# Patient Record
Sex: FEMALE | Race: WHITE | Employment: OTHER
[De-identification: names, ages, dates, MRNs, and addresses within clinical notes are randomized per-mention and may not be internally consistent; named-entity substitution may affect disease eponyms.]

---

## 2024-09-26 ENCOUNTER — HOSPITAL ENCOUNTER (OUTPATIENT)
Facility: HOSPITAL | Age: 79
Discharge: HOME | End: 2024-09-29
Payer: MEDICARE

## 2024-09-26 ENCOUNTER — TRANSCRIBE ORDERS (OUTPATIENT)
Facility: HOSPITAL | Age: 79
End: 2024-09-26

## 2024-09-26 DIAGNOSIS — M76.01 GLUTEAL TENDINITIS OF RIGHT BUTTOCK: ICD-10-CM

## 2024-09-26 DIAGNOSIS — M76.01 GLUTEAL TENDINITIS OF RIGHT BUTTOCK: Primary | ICD-10-CM

## 2024-09-26 PROCEDURE — 73502 X-RAY EXAM HIP UNI 2-3 VIEWS: CPT

## 2024-10-11 ENCOUNTER — OFFICE VISIT (OUTPATIENT)
Age: 79
End: 2024-10-11
Payer: MEDICARE

## 2024-10-11 VITALS
HEIGHT: 65 IN | SYSTOLIC BLOOD PRESSURE: 122 MMHG | WEIGHT: 124.1 LBS | OXYGEN SATURATION: 96 % | HEART RATE: 72 BPM | TEMPERATURE: 97.5 F | BODY MASS INDEX: 20.68 KG/M2 | RESPIRATION RATE: 18 BRPM | DIASTOLIC BLOOD PRESSURE: 78 MMHG

## 2024-10-11 DIAGNOSIS — Z98.2 HYDROCEPHALUS MANAGED WITH VP SHUNT (HCC): ICD-10-CM

## 2024-10-11 DIAGNOSIS — I67.1 CEREBRAL ANEURYSM: ICD-10-CM

## 2024-10-11 DIAGNOSIS — Z86.79 HISTORY OF SUBARACHNOID HEMORRHAGE: ICD-10-CM

## 2024-10-11 DIAGNOSIS — F01.A0 MILD VASCULAR DEMENTIA WITHOUT BEHAVIORAL DISTURBANCE, PSYCHOTIC DISTURBANCE, MOOD DISTURBANCE, OR ANXIETY (HCC): Primary | ICD-10-CM

## 2024-10-11 DIAGNOSIS — G91.9 HYDROCEPHALUS MANAGED WITH VP SHUNT (HCC): ICD-10-CM

## 2024-10-11 PROBLEM — J30.9 ALLERGIC RHINITIS: Status: ACTIVE | Noted: 2017-05-16

## 2024-10-11 PROBLEM — M85.80 OSTEOPENIA: Status: ACTIVE | Noted: 2018-02-12

## 2024-10-11 PROBLEM — E78.00 HYPERCHOLESTEROLEMIA: Status: ACTIVE | Noted: 2017-05-16

## 2024-10-11 PROBLEM — F03.A0 MILD DEMENTIA (HCC): Status: ACTIVE | Noted: 2022-01-11

## 2024-10-11 PROBLEM — N18.31 STAGE 3A CHRONIC KIDNEY DISEASE (HCC): Status: ACTIVE | Noted: 2022-12-22

## 2024-10-11 PROBLEM — R92.8 ABNORMAL MAMMOGRAM: Status: ACTIVE | Noted: 2019-01-03

## 2024-10-11 PROBLEM — Z98.41 S/P CATARACT EXTRACTION AND INSERTION OF INTRAOCULAR LENS, RIGHT: Status: ACTIVE | Noted: 2018-01-19

## 2024-10-11 PROBLEM — H40.9 GLAUCOMA: Status: ACTIVE | Noted: 2020-01-08

## 2024-10-11 PROBLEM — C44.711 BASAL CELL CARCINOMA OF LOWER EXTREMITY: Status: ACTIVE | Noted: 2023-09-11

## 2024-10-11 PROBLEM — I10 BENIGN ESSENTIAL HYPERTENSION: Status: ACTIVE | Noted: 2019-01-23

## 2024-10-11 PROBLEM — R41.3 MEMORY IMPAIRMENT: Status: ACTIVE | Noted: 2024-08-26

## 2024-10-11 PROBLEM — I65.29 CAROTID ATHEROSCLEROSIS: Status: ACTIVE | Noted: 2023-03-07

## 2024-10-11 PROBLEM — G31.84 MINIMAL COGNITIVE IMPAIRMENT: Status: ACTIVE | Noted: 2021-01-07

## 2024-10-11 PROBLEM — Z96.1 S/P CATARACT EXTRACTION AND INSERTION OF INTRAOCULAR LENS, RIGHT: Status: ACTIVE | Noted: 2018-01-19

## 2024-10-11 PROBLEM — N17.9 ACUTE KIDNEY INJURY (HCC): Status: ACTIVE | Noted: 2024-08-26

## 2024-10-11 PROCEDURE — G8400 PT W/DXA NO RESULTS DOC: HCPCS | Performed by: PSYCHIATRY & NEUROLOGY

## 2024-10-11 PROCEDURE — G8427 DOCREV CUR MEDS BY ELIG CLIN: HCPCS | Performed by: PSYCHIATRY & NEUROLOGY

## 2024-10-11 PROCEDURE — 99205 OFFICE O/P NEW HI 60 MIN: CPT | Performed by: PSYCHIATRY & NEUROLOGY

## 2024-10-11 PROCEDURE — 1090F PRES/ABSN URINE INCON ASSESS: CPT | Performed by: PSYCHIATRY & NEUROLOGY

## 2024-10-11 PROCEDURE — 3074F SYST BP LT 130 MM HG: CPT | Performed by: PSYCHIATRY & NEUROLOGY

## 2024-10-11 PROCEDURE — G8484 FLU IMMUNIZE NO ADMIN: HCPCS | Performed by: PSYCHIATRY & NEUROLOGY

## 2024-10-11 PROCEDURE — 4004F PT TOBACCO SCREEN RCVD TLK: CPT | Performed by: PSYCHIATRY & NEUROLOGY

## 2024-10-11 PROCEDURE — 3078F DIAST BP <80 MM HG: CPT | Performed by: PSYCHIATRY & NEUROLOGY

## 2024-10-11 PROCEDURE — 1123F ACP DISCUSS/DSCN MKR DOCD: CPT | Performed by: PSYCHIATRY & NEUROLOGY

## 2024-10-11 PROCEDURE — G8420 CALC BMI NORM PARAMETERS: HCPCS | Performed by: PSYCHIATRY & NEUROLOGY

## 2024-10-11 RX ORDER — ALENDRONATE SODIUM 70 MG/1
TABLET ORAL
COMMUNITY
Start: 2023-11-03

## 2024-10-11 RX ORDER — AMLODIPINE BESYLATE 10 MG/1
1 TABLET ORAL DAILY
COMMUNITY

## 2024-10-11 RX ORDER — CANDESARTAN CILEXETIL AND HYDROCHLOROTHIAZIDE 16; 12.5 MG/1; MG/1
TABLET ORAL
COMMUNITY

## 2024-10-11 RX ORDER — DONEPEZIL HYDROCHLORIDE 10 MG/1
TABLET, FILM COATED ORAL
COMMUNITY
Start: 2024-03-12

## 2024-10-11 RX ORDER — ROSUVASTATIN CALCIUM 10 MG/1
1 TABLET, COATED ORAL DAILY
COMMUNITY

## 2024-10-11 RX ORDER — METFORMIN HCL 500 MG
1 TABLET, EXTENDED RELEASE 24 HR ORAL 2 TIMES DAILY WITH MEALS
COMMUNITY
Start: 2024-01-10

## 2024-10-11 ASSESSMENT — PATIENT HEALTH QUESTIONNAIRE - PHQ9
SUM OF ALL RESPONSES TO PHQ QUESTIONS 1-9: 0
1. LITTLE INTEREST OR PLEASURE IN DOING THINGS: NOT AT ALL
SUM OF ALL RESPONSES TO PHQ9 QUESTIONS 1 & 2: 0
SUM OF ALL RESPONSES TO PHQ QUESTIONS 1-9: 0
2. FEELING DOWN, DEPRESSED OR HOPELESS: NOT AT ALL

## 2024-10-11 NOTE — PATIENT INSTRUCTIONS
Patient Education        Helping A Person With Dementia: Care Instructions  Your Care Instructions     Dementia is a loss of mental skills that affects daily life. It is different from mild memory loss that occurs with aging. Dementia can cause problems with memory, thinking clearly, and planning. It is different for everyone. But it usually gets worse slowly. Some people who have dementia can function well for a long time. But at some point it may become hard for the person to care for himself or herself.  It can be upsetting to learn that a loved one has this condition. You may be afraid and worried about what will happen. You may wonder how you will care for the person. There is no cure for dementia. But medicine may be able to slow memory loss and improve thinking for a while. Other medicines may help with sleep, depression, and behavior changes.  Dementia is different for everyone. In some cases, people can function well for a long time. You can help your loved one by making his or her home life easier and safer. You also need to take care of yourself. Caregiving can be stressful. But support is available to help you and give you a break when you need it.  The [employer] offers good information and support. If you are caring for someone with dementia, you can help make life safer and more comfortable. You can also help your loved one make decisions about future care. You may also want to bring up legal and financial issues. These are hard but important conversations to have.  Follow-up care is a key part of your loved one's treatment and safety. Be sure to make and go to all appointments, and call your doctor if your loved one is having problems. It's also a good idea to know your loved one's test results and keep a list of the medicines he or she takes.  How can you care for your loved one at home?  Taking care of the person  If the person takes medicine for dementia, help him or her take it  ambulatory

## 2025-02-11 ENCOUNTER — TELEPHONE (OUTPATIENT)
Age: 80
End: 2025-02-11

## 2025-02-11 NOTE — TELEPHONE ENCOUNTER
Patient requesting a summary of her first appt with Dr. Mcmanus be faxed to her PCP Dr. José Manuel Coulter    Phone number - [phone]    Fax number - [phone]

## 2025-02-12 NOTE — TELEPHONE ENCOUNTER
Office note faxed to patient's PCP, Dr. José Manuel Coulter at [phone] and confirmation received.

## 2025-02-14 ENCOUNTER — APPOINTMENT (OUTPATIENT)
Facility: HOSPITAL | Age: 80
End: 2025-02-14
Payer: MEDICARE

## 2025-02-14 ENCOUNTER — HOSPITAL ENCOUNTER (EMERGENCY)
Facility: HOSPITAL | Age: 80
Discharge: HOME | End: 2025-02-14
Attending: EMERGENCY MEDICINE
Payer: MEDICARE

## 2025-02-14 VITALS
HEART RATE: 109 BPM | TEMPERATURE: 98.3 F | HEIGHT: 65 IN | RESPIRATION RATE: 22 BRPM | DIASTOLIC BLOOD PRESSURE: 76 MMHG | SYSTOLIC BLOOD PRESSURE: 107 MMHG | BODY MASS INDEX: 20.83 KG/M2 | WEIGHT: 125 LBS | OXYGEN SATURATION: 99 %

## 2025-02-14 DIAGNOSIS — I48.91 ATRIAL FIBRILLATION, UNSPECIFIED TYPE (HCC): ICD-10-CM

## 2025-02-14 DIAGNOSIS — E87.6 HYPOKALEMIA: ICD-10-CM

## 2025-02-14 DIAGNOSIS — E86.0 DEHYDRATION: ICD-10-CM

## 2025-02-14 DIAGNOSIS — R55 SYNCOPE AND COLLAPSE: Primary | ICD-10-CM

## 2025-02-14 LAB
ALBUMIN SERPL-MCNC: 3.6 G/DL (ref 3.5–5)
ALBUMIN/GLOB SERPL: 0.8 (ref 1.1–2.2)
ALP SERPL-CCNC: 47 U/L (ref 45–117)
ALT SERPL-CCNC: 20 U/L (ref 12–78)
ANION GAP SERPL CALC-SCNC: 6 MMOL/L (ref 2–12)
AST SERPL-CCNC: 19 U/L (ref 15–37)
BASOPHILS # BLD: 0.1 K/UL (ref 0–0.1)
BASOPHILS NFR BLD: 0.9 % (ref 0–1)
BILIRUB SERPL-MCNC: 0.2 MG/DL (ref 0.2–1)
BUN SERPL-MCNC: 26 MG/DL (ref 6–20)
BUN/CREAT SERPL: 15 (ref 12–20)
CALCIUM SERPL-MCNC: 9.3 MG/DL (ref 8.5–10.1)
CHLORIDE SERPL-SCNC: 107 MMOL/L (ref 97–108)
CO2 SERPL-SCNC: 26 MMOL/L (ref 21–32)
COMMENT:: NORMAL
CREAT SERPL-MCNC: 1.76 MG/DL (ref 0.55–1.02)
DIFFERENTIAL METHOD BLD: ABNORMAL
EOSINOPHIL # BLD: 0.05 K/UL (ref 0–0.4)
EOSINOPHIL NFR BLD: 0.5 % (ref 0–7)
ERYTHROCYTE [DISTWIDTH] IN BLOOD BY AUTOMATED COUNT: 13.7 % (ref 11.5–14.5)
GLOBULIN SER CALC-MCNC: 4.4 G/DL (ref 2–4)
GLUCOSE SERPL-MCNC: 143 MG/DL (ref 65–100)
HCT VFR BLD AUTO: 37.3 % (ref 35–47)
HGB BLD-MCNC: 12.3 G/DL (ref 11.5–16)
IMM GRANULOCYTES # BLD AUTO: 0.03 K/UL (ref 0–0.04)
IMM GRANULOCYTES NFR BLD AUTO: 0.3 % (ref 0–0.5)
LYMPHOCYTES # BLD: 2.55 K/UL (ref 0.8–3.5)
LYMPHOCYTES NFR BLD: 23 % (ref 12–49)
MAGNESIUM SERPL-MCNC: 2.6 MG/DL (ref 1.6–2.4)
MCH RBC QN AUTO: 29.1 PG (ref 26–34)
MCHC RBC AUTO-ENTMCNC: 33 G/DL (ref 30–36.5)
MCV RBC AUTO: 88.4 FL (ref 80–99)
MONOCYTES # BLD: 0.64 K/UL (ref 0–1)
MONOCYTES NFR BLD: 5.8 % (ref 5–13)
NEUTS SEG # BLD: 7.73 K/UL (ref 1.8–8)
NEUTS SEG NFR BLD: 69.5 % (ref 32–75)
NRBC # BLD: 0 K/UL (ref 0–0.01)
NRBC BLD-RTO: 0 PER 100 WBC
PLATELET # BLD AUTO: 378 K/UL (ref 150–400)
PMV BLD AUTO: 10.7 FL (ref 8.9–12.9)
POTASSIUM SERPL-SCNC: 3.2 MMOL/L (ref 3.5–5.1)
PROT SERPL-MCNC: 8 G/DL (ref 6.4–8.2)
RBC # BLD AUTO: 4.22 M/UL (ref 3.8–5.2)
SODIUM SERPL-SCNC: 139 MMOL/L (ref 136–145)
SPECIMEN HOLD: NORMAL
TROPONIN I SERPL HS-MCNC: 9 NG/L (ref 0–51)
TSH SERPL DL<=0.05 MIU/L-ACNC: 0.61 UIU/ML (ref 0.36–3.74)
WBC # BLD AUTO: 11.1 K/UL (ref 3.6–11)

## 2025-02-14 PROCEDURE — 80053 COMPREHEN METABOLIC PANEL: CPT

## 2025-02-14 PROCEDURE — 84484 ASSAY OF TROPONIN QUANT: CPT

## 2025-02-14 PROCEDURE — 36415 COLL VENOUS BLD VENIPUNCTURE: CPT

## 2025-02-14 PROCEDURE — 83735 ASSAY OF MAGNESIUM: CPT

## 2025-02-14 PROCEDURE — 96360 HYDRATION IV INFUSION INIT: CPT

## 2025-02-14 PROCEDURE — 71045 X-RAY EXAM CHEST 1 VIEW: CPT

## 2025-02-14 PROCEDURE — 99285 EMERGENCY DEPT VISIT HI MDM: CPT

## 2025-02-14 PROCEDURE — 85025 COMPLETE CBC W/AUTO DIFF WBC: CPT

## 2025-02-14 PROCEDURE — 70450 CT HEAD/BRAIN W/O DYE: CPT

## 2025-02-14 PROCEDURE — 2580000003 HC RX 258: Performed by: EMERGENCY MEDICINE

## 2025-02-14 PROCEDURE — 72125 CT NECK SPINE W/O DYE: CPT

## 2025-02-14 PROCEDURE — 6370000000 HC RX 637 (ALT 250 FOR IP): Performed by: EMERGENCY MEDICINE

## 2025-02-14 PROCEDURE — 93005 ELECTROCARDIOGRAM TRACING: CPT | Performed by: EMERGENCY MEDICINE

## 2025-02-14 PROCEDURE — 84443 ASSAY THYROID STIM HORMONE: CPT

## 2025-02-14 RX ORDER — 0.9 % SODIUM CHLORIDE 0.9 %
500 INTRAVENOUS SOLUTION INTRAVENOUS ONCE
Status: COMPLETED | OUTPATIENT
Start: 2025-02-14 | End: 2025-02-14

## 2025-02-14 RX ADMIN — POTASSIUM BICARBONATE 20 MEQ: 782 TABLET, EFFERVESCENT ORAL at 16:54

## 2025-02-14 RX ADMIN — SODIUM CHLORIDE 500 ML: 9 INJECTION, SOLUTION INTRAVENOUS at 15:47

## 2025-02-14 ASSESSMENT — PAIN - FUNCTIONAL ASSESSMENT: PAIN_FUNCTIONAL_ASSESSMENT: NONE - DENIES PAIN

## 2025-02-14 NOTE — ED PROVIDER NOTES
[facility] EMERGENCY DEPARTMENT  EMERGENCY DEPARTMENT ENCOUNTER      Pt Name: Rusty Nevarez  MRN: [id number]  Birthdate 1945  Date of evaluation: 2/14/2025  Provider: Precious Pederson DO    CHIEF COMPLAINT       Chief Complaint   Patient presents with    Loss of Consciousness         HISTORY OF PRESENT ILLNESS   (Location/Symptom, Timing/Onset, Context/Setting, Quality, Duration, Modifying Factors, Severity)  Note limiting factors.   HPI      Review of External Medical Records:     Nursing Notes were reviewed.    REVIEW OF SYSTEMS    (2-9 systems for level 4, 10 or more for level 5)     Review of Systems    Except as noted above the remainder of the review of systems was reviewed and negative.       PAST MEDICAL HISTORY     Past Medical History:   Diagnosis Date    Atrial fibrillation (HCC)     Subarachnoid bleed (HCC)          SURGICAL HISTORY     History reviewed. No pertinent surgical history.      CURRENT MEDICATIONS       Previous Medications    ALENDRONATE (FOSAMAX) 70 MG TABLET    Take 1 tablet once weekly on an empty stomach with 10 to 12 ounces of water. Sit upright for 30 minutes    AMLODIPINE (NORVASC) 10 MG TABLET    Take 1 tablet by mouth daily    CANDESARTAN-HYDROCHLOROTHIAZIDE (ATACAND HCT) 16-12.5 MG PER TABLET    TAKE 1 TABLET BY MOUTH EVERY MORNING FOR HIGH BLOOD PRESSURE    CYANOCOBALAMIN 100 MCG TABLET    Take 1 tablet by mouth daily    DONEPEZIL (ARICEPT) 10 MG TABLET    TAKE 1 TABLET (10 MG) BY ORAL ROUTE ONCE DAILY IN THE EVENING    METFORMIN (GLUCOPHAGE-XR) 500 MG EXTENDED RELEASE TABLET    Take 1 tablet by mouth 2 times daily (with meals)    ROSUVASTATIN (CRESTOR) 10 MG TABLET    Take 1 tablet by mouth daily       ALLERGIES     Cefazolin and Erythromycin    FAMILY HISTORY     History reviewed. No pertinent family history.       SOCIAL HISTORY       Social History     Socioeconomic History    Marital status:      Spouse name: None    Number of children:  she was at the grocery store and was walking when suddenly everything \"went black\".  She states that she fell down to her buttocks and then eventually laid down because she was tired of sitting.  EMS reports that she had struck her head.  After initial interview family arrived and states that she did hit her head as well.  She is not on any blood thinners.  I was able to palpate a shunt but patient denied a history of brain injury or surgery.  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  Patient states that she is feeling fine now.  Consider A-fib with RVR versus cardiogenic syncope versus dehydration versus electrolyte abnormality versus other.  Will order head CT cervical spine secondary to fall with confirm report of striking head as well as lab work    Amount and/or Complexity of Data Reviewed  Radiology: ordered.  ECG/medicine tests: ordered.    Risk  Prescription drug management.            REASSESSMENT     ED Course as of 02/14/25 1657   Fri Feb 14, 2025   1459 ECG performed at 1446 shows atrial fibrillation with rapid ventricular response with ventricular rate 123 no STEMI normal axis. [WG]      ED Course User Index  [WG] Mick Brooks DO           CONSULTS:  None    PROCEDURES:  Unless otherwise noted below, none     Procedures    4:57 PM  Noted some renal insufficiency but reviewed records and she does have a history of chronic kidney disease.    Remainder of labs are noncontributory.  Did notice mildly low potassium.  I first discussed admitting her due to syncope of unknown source.  However we discussed that most likely she would rest better at home, would not be exposed to other infectious processes.  They are requesting discharge but advised them to return to emergency department symptoms worsen    FINAL IMPRESSION      1. Syncope and collapse    2. Dehydration    3. Atrial fibrillation, unspecified type (HCC)    4. Hypokalemia          DISPOSITION/PLAN

## 2025-02-14 NOTE — ED TRIAGE NOTES
Pt arrived via EMS following syncopal episode while at grocery store. Per EMS ot hit head, no obvious sign of injury. Per spouse, prior to syncopizing, pt reported feeling dizzy. Pt intermittently confused on details, this is baseline cognition per spouse. Afib 130-150 for EMS. Pt Afib 101 on arrival and pt states she \"feels fine\". Hx of brain bleed, not on blood thinners.

## 2025-02-15 LAB
EKG DIAGNOSIS: NORMAL
EKG Q-T INTERVAL: 330 MS
EKG QRS DURATION: 82 MS
EKG QTC CALCULATION (BAZETT): 472 MS
EKG R AXIS: 59 DEGREES
EKG T AXIS: 14 DEGREES
EKG VENTRICULAR RATE: 123 BPM

## 2025-02-15 PROCEDURE — 93010 ELECTROCARDIOGRAM REPORT: CPT | Performed by: INTERNAL MEDICINE

## 2025-03-15 ENCOUNTER — HOSPITAL ENCOUNTER (INPATIENT)
Facility: HOSPITAL | Age: 80
LOS: 13 days | Discharge: HOME HEALTH | DRG: 871 | End: 2025-03-28
Attending: EMERGENCY MEDICINE | Admitting: HOSPITALIST
Payer: MEDICARE

## 2025-03-15 ENCOUNTER — APPOINTMENT (OUTPATIENT)
Facility: HOSPITAL | Age: 80
DRG: 871 | End: 2025-03-15
Payer: MEDICARE

## 2025-03-15 DIAGNOSIS — A41.9 SEPSIS, DUE TO UNSPECIFIED ORGANISM, UNSPECIFIED WHETHER ACUTE ORGAN DYSFUNCTION PRESENT (HCC): Primary | ICD-10-CM

## 2025-03-15 DIAGNOSIS — R06.02 SHORTNESS OF BREATH: ICD-10-CM

## 2025-03-15 DIAGNOSIS — E86.0 DEHYDRATION: ICD-10-CM

## 2025-03-15 DIAGNOSIS — R68.89 FLU-LIKE SYMPTOMS: ICD-10-CM

## 2025-03-15 DIAGNOSIS — I48.11 LONGSTANDING PERSISTENT ATRIAL FIBRILLATION (HCC): ICD-10-CM

## 2025-03-15 DIAGNOSIS — R41.0 CONFUSION: ICD-10-CM

## 2025-03-15 LAB
ALBUMIN SERPL-MCNC: 3.1 G/DL (ref 3.5–5.2)
ALBUMIN/GLOB SERPL: 0.9 (ref 1.1–2.2)
ALP SERPL-CCNC: 123 U/L (ref 35–104)
ALT SERPL-CCNC: 27 U/L (ref 10–35)
AMORPH CRY URNS QL MICRO: ABNORMAL
ANION GAP SERPL CALC-SCNC: 15 MMOL/L (ref 2–12)
APPEARANCE UR: ABNORMAL
AST SERPL-CCNC: 26 U/L (ref 10–35)
BACTERIA URNS QL MICRO: ABNORMAL /HPF
BASOPHILS # BLD: 0.02 K/UL (ref 0–0.1)
BASOPHILS NFR BLD: 0.1 % (ref 0–1)
BILIRUB SERPL-MCNC: 0.9 MG/DL (ref 0.2–1)
BILIRUB UR QL CFM: NEGATIVE
BUN SERPL-MCNC: 20 MG/DL (ref 8–23)
BUN/CREAT SERPL: 17 (ref 12–20)
CALCIUM SERPL-MCNC: 8.2 MG/DL (ref 8.8–10.2)
CHLORIDE SERPL-SCNC: 99 MMOL/L (ref 98–107)
CO2 SERPL-SCNC: 23 MMOL/L (ref 22–29)
COLOR UR: ABNORMAL
COMMENT:: NORMAL
CREAT SERPL-MCNC: 1.19 MG/DL (ref 0.5–0.9)
DIFFERENTIAL METHOD BLD: ABNORMAL
EOSINOPHIL # BLD: 0 K/UL (ref 0–0.4)
EOSINOPHIL NFR BLD: 0 % (ref 0–7)
EPITH CASTS URNS QL MICRO: ABNORMAL /LPF
ERYTHROCYTE [DISTWIDTH] IN BLOOD BY AUTOMATED COUNT: 14.4 % (ref 11.5–14.5)
FLUAV RNA SPEC QL NAA+PROBE: NOT DETECTED
FLUBV RNA SPEC QL NAA+PROBE: NOT DETECTED
GLOBULIN SER CALC-MCNC: 3.5 G/DL (ref 2–4)
GLUCOSE SERPL-MCNC: 179 MG/DL (ref 65–100)
GLUCOSE UR STRIP.AUTO-MCNC: NEGATIVE MG/DL
GRAN CASTS URNS QL MICRO: ABNORMAL /LPF
HCT VFR BLD AUTO: 29.3 % (ref 35–47)
HGB BLD-MCNC: 10.2 G/DL (ref 11.5–16)
HGB UR QL STRIP: ABNORMAL
IMM GRANULOCYTES # BLD AUTO: 0.08 K/UL (ref 0–0.04)
IMM GRANULOCYTES NFR BLD AUTO: 0.5 % (ref 0–0.5)
KETONES UR QL STRIP.AUTO: ABNORMAL MG/DL
LACTATE SERPL-SCNC: 1.1 MMOL/L (ref 0.4–2)
LEUKOCYTE ESTERASE UR QL STRIP.AUTO: ABNORMAL
LIPASE SERPL-CCNC: 25 U/L (ref 13–60)
LYMPHOCYTES # BLD: 0.52 K/UL (ref 0.8–3.5)
LYMPHOCYTES NFR BLD: 3.2 % (ref 12–49)
MCH RBC QN AUTO: 29.6 PG (ref 26–34)
MCHC RBC AUTO-ENTMCNC: 34.8 G/DL (ref 30–36.5)
MCV RBC AUTO: 84.9 FL (ref 80–99)
MONOCYTES # BLD: 1.1 K/UL (ref 0–1)
MONOCYTES NFR BLD: 6.7 % (ref 5–13)
NEUTS SEG # BLD: 14.68 K/UL (ref 1.8–8)
NEUTS SEG NFR BLD: 89.5 % (ref 32–75)
NITRITE UR QL STRIP.AUTO: NEGATIVE
NRBC # BLD: 0 K/UL (ref 0–0.01)
NRBC BLD-RTO: 0 PER 100 WBC
PH UR STRIP: 5.5 (ref 5–8)
PLATELET # BLD AUTO: 188 K/UL (ref 150–400)
PMV BLD AUTO: 11.6 FL (ref 8.9–12.9)
POTASSIUM SERPL-SCNC: 3.2 MMOL/L (ref 3.5–5.1)
PROT SERPL-MCNC: 6.6 G/DL (ref 6.4–8.3)
PROT UR STRIP-MCNC: >300 MG/DL
RBC # BLD AUTO: 3.45 M/UL (ref 3.8–5.2)
RBC #/AREA URNS HPF: ABNORMAL /HPF
RBC MORPH BLD: ABNORMAL
SARS-COV-2 RNA RESP QL NAA+PROBE: NOT DETECTED
SODIUM SERPL-SCNC: 137 MMOL/L (ref 136–145)
SOURCE: NORMAL
SP GR UR REFRACTOMETRY: 1.02 (ref 1–1.03)
SPECIMEN HOLD: NORMAL
SPECIMEN HOLD: NORMAL
UROBILINOGEN UR QL STRIP.AUTO: 1 EU/DL (ref 0.2–1)
WBC # BLD AUTO: 16.4 K/UL (ref 3.6–11)
WBC MORPH BLD: ABNORMAL
WBC URNS QL MICRO: ABNORMAL /HPF (ref 0–4)

## 2025-03-15 PROCEDURE — 81001 URINALYSIS AUTO W/SCOPE: CPT

## 2025-03-15 PROCEDURE — 2580000003 HC RX 258: Performed by: HOSPITALIST

## 2025-03-15 PROCEDURE — 1100000000 HC RM PRIVATE

## 2025-03-15 PROCEDURE — 87154 CUL TYP ID BLD PTHGN 6+ TRGT: CPT

## 2025-03-15 PROCEDURE — 6360000002 HC RX W HCPCS: Performed by: HOSPITALIST

## 2025-03-15 PROCEDURE — 85025 COMPLETE CBC W/AUTO DIFF WBC: CPT

## 2025-03-15 PROCEDURE — 71046 X-RAY EXAM CHEST 2 VIEWS: CPT

## 2025-03-15 PROCEDURE — 96361 HYDRATE IV INFUSION ADD-ON: CPT

## 2025-03-15 PROCEDURE — 99285 EMERGENCY DEPT VISIT HI MDM: CPT

## 2025-03-15 PROCEDURE — 2500000003 HC RX 250 WO HCPCS: Performed by: HOSPITALIST

## 2025-03-15 PROCEDURE — 87636 SARSCOV2 & INF A&B AMP PRB: CPT

## 2025-03-15 PROCEDURE — 87040 BLOOD CULTURE FOR BACTERIA: CPT

## 2025-03-15 PROCEDURE — 6370000000 HC RX 637 (ALT 250 FOR IP): Performed by: EMERGENCY MEDICINE

## 2025-03-15 PROCEDURE — 6370000000 HC RX 637 (ALT 250 FOR IP): Performed by: HOSPITALIST

## 2025-03-15 PROCEDURE — 96365 THER/PROPH/DIAG IV INF INIT: CPT

## 2025-03-15 PROCEDURE — 83605 ASSAY OF LACTIC ACID: CPT

## 2025-03-15 PROCEDURE — 93005 ELECTROCARDIOGRAM TRACING: CPT | Performed by: EMERGENCY MEDICINE

## 2025-03-15 PROCEDURE — 80053 COMPREHEN METABOLIC PANEL: CPT

## 2025-03-15 PROCEDURE — 87077 CULTURE AEROBIC IDENTIFY: CPT

## 2025-03-15 PROCEDURE — 36415 COLL VENOUS BLD VENIPUNCTURE: CPT

## 2025-03-15 PROCEDURE — 2500000003 HC RX 250 WO HCPCS: Performed by: EMERGENCY MEDICINE

## 2025-03-15 PROCEDURE — 6360000002 HC RX W HCPCS: Performed by: EMERGENCY MEDICINE

## 2025-03-15 PROCEDURE — 83690 ASSAY OF LIPASE: CPT

## 2025-03-15 PROCEDURE — 87086 URINE CULTURE/COLONY COUNT: CPT

## 2025-03-15 PROCEDURE — 87186 SC STD MICRODIL/AGAR DIL: CPT

## 2025-03-15 PROCEDURE — 87088 URINE BACTERIA CULTURE: CPT

## 2025-03-15 PROCEDURE — 2580000003 HC RX 258: Performed by: EMERGENCY MEDICINE

## 2025-03-15 PROCEDURE — 0202U NFCT DS 22 TRGT SARS-COV-2: CPT

## 2025-03-15 RX ORDER — VALSARTAN 80 MG/1
160 TABLET ORAL DAILY
Status: DISCONTINUED | OUTPATIENT
Start: 2025-03-16 | End: 2025-03-16

## 2025-03-15 RX ORDER — SODIUM CHLORIDE 9 MG/ML
INJECTION, SOLUTION INTRAVENOUS PRN
Status: DISCONTINUED | OUTPATIENT
Start: 2025-03-15 | End: 2025-03-28 | Stop reason: HOSPADM

## 2025-03-15 RX ORDER — ONDANSETRON 2 MG/ML
4 INJECTION INTRAMUSCULAR; INTRAVENOUS EVERY 6 HOURS PRN
Status: DISCONTINUED | OUTPATIENT
Start: 2025-03-15 | End: 2025-03-28 | Stop reason: HOSPADM

## 2025-03-15 RX ORDER — ENOXAPARIN SODIUM 100 MG/ML
40 INJECTION SUBCUTANEOUS DAILY
Status: DISCONTINUED | OUTPATIENT
Start: 2025-03-16 | End: 2025-03-28 | Stop reason: HOSPADM

## 2025-03-15 RX ORDER — ROSUVASTATIN CALCIUM 10 MG/1
10 TABLET, COATED ORAL NIGHTLY
Status: DISCONTINUED | OUTPATIENT
Start: 2025-03-15 | End: 2025-03-28 | Stop reason: HOSPADM

## 2025-03-15 RX ORDER — MAGNESIUM SULFATE IN WATER 40 MG/ML
2000 INJECTION, SOLUTION INTRAVENOUS PRN
Status: DISCONTINUED | OUTPATIENT
Start: 2025-03-15 | End: 2025-03-28 | Stop reason: HOSPADM

## 2025-03-15 RX ORDER — SODIUM CHLORIDE 9 MG/ML
INJECTION, SOLUTION INTRAVENOUS CONTINUOUS
Status: DISCONTINUED | OUTPATIENT
Start: 2025-03-15 | End: 2025-03-16

## 2025-03-15 RX ORDER — POTASSIUM CHLORIDE 7.45 MG/ML
10 INJECTION INTRAVENOUS PRN
Status: DISCONTINUED | OUTPATIENT
Start: 2025-03-15 | End: 2025-03-28 | Stop reason: HOSPADM

## 2025-03-15 RX ORDER — ACETAMINOPHEN 325 MG/1
650 TABLET ORAL EVERY 6 HOURS PRN
Status: DISCONTINUED | OUTPATIENT
Start: 2025-03-15 | End: 2025-03-28 | Stop reason: HOSPADM

## 2025-03-15 RX ORDER — 0.9 % SODIUM CHLORIDE 0.9 %
1000 INTRAVENOUS SOLUTION INTRAVENOUS ONCE
Status: COMPLETED | OUTPATIENT
Start: 2025-03-15 | End: 2025-03-15

## 2025-03-15 RX ORDER — SODIUM CHLORIDE 0.9 % (FLUSH) 0.9 %
5-40 SYRINGE (ML) INJECTION PRN
Status: DISCONTINUED | OUTPATIENT
Start: 2025-03-15 | End: 2025-03-28 | Stop reason: HOSPADM

## 2025-03-15 RX ORDER — AMLODIPINE BESYLATE 5 MG/1
10 TABLET ORAL DAILY
Status: DISCONTINUED | OUTPATIENT
Start: 2025-03-16 | End: 2025-03-15

## 2025-03-15 RX ORDER — POLYETHYLENE GLYCOL 3350 17 G/17G
17 POWDER, FOR SOLUTION ORAL DAILY PRN
Status: DISCONTINUED | OUTPATIENT
Start: 2025-03-15 | End: 2025-03-28 | Stop reason: HOSPADM

## 2025-03-15 RX ORDER — DEXTROSE MONOHYDRATE 100 MG/ML
INJECTION, SOLUTION INTRAVENOUS CONTINUOUS PRN
Status: DISCONTINUED | OUTPATIENT
Start: 2025-03-15 | End: 2025-03-28 | Stop reason: HOSPADM

## 2025-03-15 RX ORDER — ACETAMINOPHEN 650 MG/1
650 SUPPOSITORY RECTAL EVERY 6 HOURS PRN
Status: DISCONTINUED | OUTPATIENT
Start: 2025-03-15 | End: 2025-03-28 | Stop reason: HOSPADM

## 2025-03-15 RX ORDER — SODIUM CHLORIDE 0.9 % (FLUSH) 0.9 %
5-40 SYRINGE (ML) INJECTION EVERY 12 HOURS SCHEDULED
Status: DISCONTINUED | OUTPATIENT
Start: 2025-03-15 | End: 2025-03-28 | Stop reason: HOSPADM

## 2025-03-15 RX ORDER — CANDESARTAN CILEXETIL AND HYDROCHLOROTHIAZIDE 16; 12.5 MG/1; MG/1
1 TABLET ORAL DAILY
Status: DISCONTINUED | OUTPATIENT
Start: 2025-03-16 | End: 2025-03-15

## 2025-03-15 RX ORDER — METOPROLOL SUCCINATE 50 MG/1
50 TABLET, EXTENDED RELEASE ORAL DAILY
Status: DISCONTINUED | OUTPATIENT
Start: 2025-03-16 | End: 2025-03-28 | Stop reason: HOSPADM

## 2025-03-15 RX ORDER — METOPROLOL SUCCINATE 50 MG/1
50 TABLET, EXTENDED RELEASE ORAL DAILY
COMMUNITY

## 2025-03-15 RX ORDER — METOPROLOL TARTRATE 1 MG/ML
5 INJECTION, SOLUTION INTRAVENOUS EVERY 6 HOURS PRN
Status: DISCONTINUED | OUTPATIENT
Start: 2025-03-15 | End: 2025-03-27

## 2025-03-15 RX ORDER — INSULIN LISPRO 100 [IU]/ML
0-8 INJECTION, SOLUTION INTRAVENOUS; SUBCUTANEOUS
Status: DISCONTINUED | OUTPATIENT
Start: 2025-03-15 | End: 2025-03-15

## 2025-03-15 RX ORDER — ONDANSETRON 4 MG/1
4 TABLET, ORALLY DISINTEGRATING ORAL EVERY 8 HOURS PRN
Status: DISCONTINUED | OUTPATIENT
Start: 2025-03-15 | End: 2025-03-28 | Stop reason: HOSPADM

## 2025-03-15 RX ORDER — DONEPEZIL HYDROCHLORIDE 5 MG/1
10 TABLET, FILM COATED ORAL NIGHTLY
Status: DISCONTINUED | OUTPATIENT
Start: 2025-03-15 | End: 2025-03-28 | Stop reason: HOSPADM

## 2025-03-15 RX ORDER — HYDROCHLOROTHIAZIDE 25 MG/1
12.5 TABLET ORAL DAILY
Status: DISCONTINUED | OUTPATIENT
Start: 2025-03-16 | End: 2025-03-15

## 2025-03-15 RX ORDER — POTASSIUM CHLORIDE 750 MG/1
40 TABLET, EXTENDED RELEASE ORAL PRN
Status: DISCONTINUED | OUTPATIENT
Start: 2025-03-15 | End: 2025-03-28 | Stop reason: HOSPADM

## 2025-03-15 RX ORDER — INSULIN LISPRO 100 [IU]/ML
0-8 INJECTION, SOLUTION INTRAVENOUS; SUBCUTANEOUS
Status: DISCONTINUED | OUTPATIENT
Start: 2025-03-16 | End: 2025-03-28 | Stop reason: HOSPADM

## 2025-03-15 RX ORDER — ACETAMINOPHEN 500 MG
1000 TABLET ORAL
Status: COMPLETED | OUTPATIENT
Start: 2025-03-15 | End: 2025-03-15

## 2025-03-15 RX ADMIN — SODIUM CHLORIDE, PRESERVATIVE FREE 5 ML: 5 INJECTION INTRAVENOUS at 22:53

## 2025-03-15 RX ADMIN — WATER 1000 MG: 1 INJECTION INTRAMUSCULAR; INTRAVENOUS; SUBCUTANEOUS at 19:39

## 2025-03-15 RX ADMIN — POTASSIUM BICARBONATE 40 MEQ: 782 TABLET, EFFERVESCENT ORAL at 22:53

## 2025-03-15 RX ADMIN — AZITHROMYCIN MONOHYDRATE 500 MG: 500 INJECTION, POWDER, LYOPHILIZED, FOR SOLUTION INTRAVENOUS at 22:53

## 2025-03-15 RX ADMIN — ACETAMINOPHEN 1000 MG: 500 TABLET ORAL at 19:38

## 2025-03-15 RX ADMIN — SODIUM CHLORIDE: 0.9 INJECTION, SOLUTION INTRAVENOUS at 22:03

## 2025-03-15 RX ADMIN — SODIUM CHLORIDE 1000 ML: 0.9 INJECTION, SOLUTION INTRAVENOUS at 19:39

## 2025-03-15 RX ADMIN — ROSUVASTATIN CALCIUM 10 MG: 10 TABLET, FILM COATED ORAL at 22:53

## 2025-03-15 RX ADMIN — DONEPEZIL HYDROCHLORIDE 10 MG: 5 TABLET ORAL at 22:53

## 2025-03-15 ASSESSMENT — PAIN SCALES - GENERAL: PAINLEVEL_OUTOF10: 0

## 2025-03-15 ASSESSMENT — PAIN - FUNCTIONAL ASSESSMENT: PAIN_FUNCTIONAL_ASSESSMENT: NONE - DENIES PAIN

## 2025-03-15 NOTE — ED PROVIDER NOTES
[facility] EMERGENCY DEPARTMENT  EMERGENCY DEPARTMENT ENCOUNTER      Pt Name: Rusty Nevarez  MRN: [id number]  Birthdate 1945  Date of evaluation: 3/15/2025  Provider: Shin Frausto MD    CHIEF COMPLAINT       Chief Complaint   Patient presents with    Flu-Like Symptoms         HISTORY OF PRESENT ILLNESS   (Location/Symptom, Timing/Onset, Context/Setting, Quality, Duration, Modifying Factors, Severity)  Note limiting factors.   79-year-old female presents from home accompanied by her  and daughter.  She been feeling sick for the past 3 days.  Been having chills with what has been described as rigors off and on for the past few days.  She is been getting sweaty and nauseated with fatigue.  Also increased confusion.   also reports increased urinary frequency.  She has had a slight cough.  No vomiting.  She has had diarrhea.  Patient denies any chest pain or shortness of breath.  Medical history significant for subarachnoid bleed and atrial fibrillation.    The history is provided by the patient, the spouse, a relative and medical records.         Review of External Medical Records:     Nursing Notes were reviewed.    REVIEW OF SYSTEMS    (2-9 systems for level 4, 10 or more for level 5)     Review of Systems   Constitutional:  Positive for chills and fatigue.   HENT:  Negative for sore throat.    Eyes:  Negative for visual disturbance.   Respiratory:  Negative for cough.    Cardiovascular:  Negative for palpitations.   Gastrointestinal:  Negative for vomiting.   Genitourinary:  Negative for difficulty urinating.   Musculoskeletal:  Negative for myalgias.   Skin:  Negative for rash.   Neurological:  Positive for weakness.       Except as noted above the remainder of the review of systems was reviewed and negative.       PAST MEDICAL HISTORY     Past Medical History:   Diagnosis Date    Atrial fibrillation (HCC)     Subarachnoid bleed (HCC)          SURGICAL HISTORY     No past surgical history

## 2025-03-15 NOTE — ED NOTES
MD Barksdale and MD Frausto consulted on patient flagging for sepsis criteria at this time. Blood cultures and lactic acid collected, awaiting fluid bolus and antibiotic orders from MD.

## 2025-03-15 NOTE — ED TRIAGE NOTES
Pt to ER for cough, chills, head pain, N/V, D frequency urination started on Wednesday.     Pt denies abdominal pain or back pain. Pt denies CP or SOB.     Denies sick contacts.     Hx: Brain bleed,  Shunt, HTN HLD.

## 2025-03-16 ENCOUNTER — APPOINTMENT (OUTPATIENT)
Facility: HOSPITAL | Age: 80
DRG: 871 | End: 2025-03-16
Attending: HOSPITALIST
Payer: MEDICARE

## 2025-03-16 ENCOUNTER — APPOINTMENT (OUTPATIENT)
Facility: HOSPITAL | Age: 80
DRG: 871 | End: 2025-03-16
Payer: MEDICARE

## 2025-03-16 LAB
ACB COMPLEX DNA BLD POS QL NAA+NON-PROBE: NOT DETECTED
ACCESSION NUMBER, LLC1M: ABNORMAL
ANION GAP SERPL CALC-SCNC: 8 MMOL/L (ref 2–12)
ARTERIAL PATENCY WRIST A: ABNORMAL
B FRAGILIS DNA BLD POS QL NAA+NON-PROBE: NOT DETECTED
B PERT DNA SPEC QL NAA+PROBE: NOT DETECTED
BASE EXCESS BLDA CALC-SCNC: 0 MMOL/L
BASOPHILS # BLD: 0 K/UL (ref 0–0.1)
BASOPHILS NFR BLD: 0 % (ref 0–1)
BDY SITE: ABNORMAL
BIOFIRE TEST COMMENT: ABNORMAL
BORDETELLA PARAPERTUSSIS BY PCR: NOT DETECTED
BUN SERPL-MCNC: 19 MG/DL (ref 6–20)
BUN/CREAT SERPL: 15 (ref 12–20)
C ALBICANS DNA BLD POS QL NAA+NON-PROBE: NOT DETECTED
C AURIS DNA BLD POS QL NAA+NON-PROBE: NOT DETECTED
C GATTII+NEOFOR DNA BLD POS QL NAA+N-PRB: NOT DETECTED
C GLABRATA DNA BLD POS QL NAA+NON-PROBE: NOT DETECTED
C KRUSEI DNA BLD POS QL NAA+NON-PROBE: NOT DETECTED
C PARAP DNA BLD POS QL NAA+NON-PROBE: NOT DETECTED
C PNEUM DNA SPEC QL NAA+PROBE: NOT DETECTED
C TROPICLS DNA BLD POS QL NAA+NON-PROBE: NOT DETECTED
CALCIUM SERPL-MCNC: 8 MG/DL (ref 8.5–10.1)
CHLORIDE SERPL-SCNC: 109 MMOL/L (ref 97–108)
CO2 SERPL-SCNC: 22 MMOL/L (ref 21–32)
CREAT SERPL-MCNC: 1.29 MG/DL (ref 0.55–1.02)
DIFFERENTIAL METHOD BLD: ABNORMAL
E CLOAC COMP DNA BLD POS NAA+NON-PROBE: NOT DETECTED
E COLI DNA BLD POS QL NAA+NON-PROBE: NOT DETECTED
E FAECALIS DNA BLD POS QL NAA+NON-PROBE: NOT DETECTED
E FAECIUM DNA BLD POS QL NAA+NON-PROBE: NOT DETECTED
ECHO AO ARCH DIAM: 1.5 CM
ECHO AO ASC DIAM: 3.2 CM
ECHO AO ASCENDING AORTA INDEX: 1.94 CM/M2
ECHO AO ROOT DIAM: 3.3 CM
ECHO AO ROOT INDEX: 2 CM/M2
ECHO AR MAX VEL PISA: 2.4 M/S
ECHO AV AREA PEAK VELOCITY: 2.2 CM2
ECHO AV AREA PEAK VELOCITY: 2.2 CM2
ECHO AV AREA VTI: 2.2 CM2
ECHO AV AREA/BSA VTI: 1.3 CM2/M2
ECHO AV MEAN GRADIENT: 3 MMHG
ECHO AV MEAN VELOCITY: 0.8 M/S
ECHO AV PEAK GRADIENT: 6 MMHG
ECHO AV PEAK GRADIENT: 6 MMHG
ECHO AV PEAK VELOCITY: 1.2 M/S
ECHO AV PEAK VELOCITY: 1.3 M/S
ECHO AV REGURGITANT PHT: 542.8 MS
ECHO AV VTI: 27.5 CM
ECHO BSA: 1.64 M2
ECHO LA DIAMETER INDEX: 1.82 CM/M2
ECHO LA DIAMETER: 3 CM
ECHO LA TO AORTIC ROOT RATIO: 0.91
ECHO LA VOL A-L A2C: 29 ML (ref 22–52)
ECHO LA VOL A-L A4C: 36 ML (ref 22–52)
ECHO LA VOL BP: 31 ML (ref 22–52)
ECHO LA VOL MOD A2C: 27 ML (ref 22–52)
ECHO LA VOL MOD A4C: 32 ML (ref 22–52)
ECHO LA VOL/BSA BIPLANE: 19 ML/M2 (ref 16–34)
ECHO LA VOLUME AREA LENGTH: 34 ML
ECHO LA VOLUME INDEX A-L A2C: 18 ML/M2 (ref 16–34)
ECHO LA VOLUME INDEX A-L A4C: 22 ML/M2 (ref 16–34)
ECHO LA VOLUME INDEX AREA LENGTH: 21 ML/M2 (ref 16–34)
ECHO LA VOLUME INDEX MOD A2C: 16 ML/M2 (ref 16–34)
ECHO LA VOLUME INDEX MOD A4C: 19 ML/M2 (ref 16–34)
ECHO LV E' LATERAL VELOCITY: 10.5 CM/S
ECHO LV E' SEPTAL VELOCITY: 8.62 CM/S
ECHO LV EDV A2C: 86 ML
ECHO LV EDV A4C: 71 ML
ECHO LV EDV BP: 82 ML (ref 56–104)
ECHO LV EDV INDEX A4C: 43 ML/M2
ECHO LV EDV INDEX BP: 50 ML/M2
ECHO LV EDV NDEX A2C: 52 ML/M2
ECHO LV EF PHYSICIAN: 60 %
ECHO LV EJECTION FRACTION A2C: 57 %
ECHO LV EJECTION FRACTION A4C: 58 %
ECHO LV EJECTION FRACTION BIPLANE: 57 % (ref 55–100)
ECHO LV ESV A2C: 37 ML
ECHO LV ESV A4C: 29 ML
ECHO LV ESV BP: 35 ML (ref 19–49)
ECHO LV ESV INDEX A2C: 22 ML/M2
ECHO LV ESV INDEX A4C: 18 ML/M2
ECHO LV ESV INDEX BP: 21 ML/M2
ECHO LV FRACTIONAL SHORTENING: 33 % (ref 28–44)
ECHO LV INTERNAL DIMENSION DIASTOLE INDEX: 2.61 CM/M2
ECHO LV INTERNAL DIMENSION DIASTOLIC: 4.3 CM (ref 3.9–5.3)
ECHO LV INTERNAL DIMENSION SYSTOLIC INDEX: 1.76 CM/M2
ECHO LV INTERNAL DIMENSION SYSTOLIC: 2.9 CM
ECHO LV IVSD: 0.8 CM (ref 0.6–0.9)
ECHO LV MASS 2D: 96.8 G (ref 67–162)
ECHO LV MASS INDEX 2D: 58.7 G/M2 (ref 43–95)
ECHO LV POSTERIOR WALL DIASTOLIC: 0.7 CM (ref 0.6–0.9)
ECHO LV RELATIVE WALL THICKNESS RATIO: 0.33
ECHO LVOT AREA: 2.8 CM2
ECHO LVOT AV VTI INDEX: 0.79
ECHO LVOT DIAM: 1.9 CM
ECHO LVOT MEAN GRADIENT: 2 MMHG
ECHO LVOT PEAK GRADIENT: 4 MMHG
ECHO LVOT PEAK VELOCITY: 1 M/S
ECHO LVOT STROKE VOLUME INDEX: 37.3 ML/M2
ECHO LVOT SV: 61.5 ML
ECHO LVOT VTI: 21.7 CM
ECHO MV A VELOCITY: 0.88 M/S
ECHO MV E DECELERATION TIME (DT): 260.6 MS
ECHO MV E VELOCITY: 0.79 M/S
ECHO MV E/A RATIO: 0.9
ECHO MV E/E' LATERAL: 7.52
ECHO MV E/E' RATIO (AVERAGED): 8.34
ECHO MV E/E' SEPTAL: 9.16
ECHO MV REGURGITANT PEAK GRADIENT: 77 MMHG
ECHO MV REGURGITANT PEAK VELOCITY: 4.4 M/S
ECHO PULMONARY ARTERY END DIASTOLIC PRESSURE: 1 MMHG
ECHO PV MAX VELOCITY: 0.9 M/S
ECHO PV PEAK GRADIENT: 3 MMHG
ECHO PV REGURGITANT MAX VELOCITY: 0.5 M/S
ECHO RV FREE WALL PEAK S': 12.9 CM/S
ECHO RV INTERNAL DIMENSION: 3.9 CM
ECHO RV TAPSE: 2 CM (ref 1.7–?)
ECHO TV REGURGITANT MAX VELOCITY: 2.48 M/S
ECHO TV REGURGITANT PEAK GRADIENT: 25 MMHG
EKG ATRIAL RATE: 108 BPM
EKG DIAGNOSIS: NORMAL
EKG P AXIS: 56 DEGREES
EKG P-R INTERVAL: 130 MS
EKG Q-T INTERVAL: 318 MS
EKG QRS DURATION: 64 MS
EKG QTC CALCULATION (BAZETT): 426 MS
EKG R AXIS: 31 DEGREES
EKG T AXIS: 62 DEGREES
EKG VENTRICULAR RATE: 108 BPM
ENTEROBACTERALES DNA BLD POS NAA+N-PRB: NOT DETECTED
EOSINOPHIL # BLD: 0 K/UL (ref 0–0.4)
EOSINOPHIL NFR BLD: 0 % (ref 0–7)
ERYTHROCYTE [DISTWIDTH] IN BLOOD BY AUTOMATED COUNT: 14.6 % (ref 11.5–14.5)
FLUAV SUBTYP SPEC NAA+PROBE: NOT DETECTED
FLUBV RNA SPEC QL NAA+PROBE: NOT DETECTED
GLUCOSE BLD STRIP.AUTO-MCNC: 166 MG/DL (ref 65–117)
GLUCOSE BLD STRIP.AUTO-MCNC: 211 MG/DL (ref 65–117)
GLUCOSE BLD STRIP.AUTO-MCNC: 91 MG/DL (ref 65–117)
GLUCOSE BLD STRIP.AUTO-MCNC: 92 MG/DL (ref 65–117)
GLUCOSE SERPL-MCNC: 164 MG/DL (ref 65–100)
GP B STREP DNA BLD POS QL NAA+NON-PROBE: NOT DETECTED
HADV DNA SPEC QL NAA+PROBE: NOT DETECTED
HAEM INFLU DNA BLD POS QL NAA+NON-PROBE: NOT DETECTED
HCO3 BLDA-SCNC: 22 MMOL/L (ref 22–26)
HCOV 229E RNA SPEC QL NAA+PROBE: NOT DETECTED
HCOV HKU1 RNA SPEC QL NAA+PROBE: NOT DETECTED
HCOV NL63 RNA SPEC QL NAA+PROBE: NOT DETECTED
HCOV OC43 RNA SPEC QL NAA+PROBE: NOT DETECTED
HCT VFR BLD AUTO: 29.6 % (ref 35–47)
HGB BLD-MCNC: 9.8 G/DL (ref 11.5–16)
HMPV RNA SPEC QL NAA+PROBE: NOT DETECTED
HPIV1 RNA SPEC QL NAA+PROBE: NOT DETECTED
HPIV2 RNA SPEC QL NAA+PROBE: NOT DETECTED
HPIV3 RNA SPEC QL NAA+PROBE: NOT DETECTED
HPIV4 RNA SPEC QL NAA+PROBE: NOT DETECTED
IMM GRANULOCYTES # BLD AUTO: 0 K/UL
IMM GRANULOCYTES NFR BLD AUTO: 0 %
K OXYTOCA DNA BLD POS QL NAA+NON-PROBE: NOT DETECTED
KLEBSIELLA SP DNA BLD POS QL NAA+NON-PRB: NOT DETECTED
KLEBSIELLA SP DNA BLD POS QL NAA+NON-PRB: NOT DETECTED
L MONOCYTOG DNA BLD POS QL NAA+NON-PROBE: NOT DETECTED
LYMPHOCYTES # BLD: 0.8 K/UL (ref 0.8–3.5)
LYMPHOCYTES NFR BLD: 8 % (ref 12–49)
M PNEUMO DNA SPEC QL NAA+PROBE: NOT DETECTED
MAGNESIUM SERPL-MCNC: 2.2 MG/DL (ref 1.6–2.4)
MCH RBC QN AUTO: 29.2 PG (ref 26–34)
MCHC RBC AUTO-ENTMCNC: 33.1 G/DL (ref 30–36.5)
MCV RBC AUTO: 88.1 FL (ref 80–99)
MONOCYTES # BLD: 0.2 K/UL (ref 0–1)
MONOCYTES NFR BLD: 2 % (ref 5–13)
N MEN DNA BLD POS QL NAA+NON-PROBE: NOT DETECTED
NEUTS BAND NFR BLD MANUAL: 19 % (ref 0–6)
NEUTS SEG # BLD: 9 K/UL (ref 1.8–8)
NEUTS SEG NFR BLD: 71 % (ref 32–75)
NRBC # BLD: 0 K/UL (ref 0–0.01)
NRBC BLD-RTO: 0 PER 100 WBC
NT PRO BNP: 1859 PG/ML
P AERUGINOSA DNA BLD POS NAA+NON-PROBE: NOT DETECTED
PCO2 BLDA: 28 MMHG (ref 35–45)
PH BLDA: 7.51 (ref 7.35–7.45)
PLATELET # BLD AUTO: 175 K/UL (ref 150–400)
PMV BLD AUTO: 11.9 FL (ref 8.9–12.9)
PO2 BLDA: 126 MMHG (ref 80–100)
POTASSIUM SERPL-SCNC: 3.5 MMOL/L (ref 3.5–5.1)
PROTEUS SP DNA BLD POS QL NAA+NON-PROBE: NOT DETECTED
RBC # BLD AUTO: 3.36 M/UL (ref 3.8–5.2)
RBC MORPH BLD: ABNORMAL
RESISTANT GENE TARGETS: ABNORMAL
RSV RNA SPEC QL NAA+PROBE: NOT DETECTED
RV+EV RNA SPEC QL NAA+PROBE: NOT DETECTED
S AUREUS DNA BLD POS QL NAA+NON-PROBE: NOT DETECTED
S AUREUS+CONS DNA BLD POS NAA+NON-PROBE: NOT DETECTED
S EPIDERMIDIS DNA BLD POS QL NAA+NON-PRB: NOT DETECTED
S LUGDUNENSIS DNA BLD POS QL NAA+NON-PRB: NOT DETECTED
S MALTOPHILIA DNA BLD POS QL NAA+NON-PRB: NOT DETECTED
S MARCESCENS DNA BLD POS NAA+NON-PROBE: NOT DETECTED
S PNEUM DNA BLD POS QL NAA+NON-PROBE: NOT DETECTED
S PYO DNA BLD POS QL NAA+NON-PROBE: NOT DETECTED
SALMONELLA DNA BLD POS QL NAA+NON-PROBE: NOT DETECTED
SAO2 % BLD: 99 % (ref 92–97)
SAO2% DEVICE SAO2% SENSOR NAME: ABNORMAL
SARS-COV-2 RNA RESP QL NAA+PROBE: NOT DETECTED
SERVICE CMNT-IMP: ABNORMAL
SERVICE CMNT-IMP: ABNORMAL
SERVICE CMNT-IMP: NORMAL
SERVICE CMNT-IMP: NORMAL
SODIUM SERPL-SCNC: 139 MMOL/L (ref 136–145)
SPECIMEN SITE: ABNORMAL
STREPTOCOCCUS DNA BLD POS NAA+NON-PROBE: DETECTED
TROPONIN I SERPL HS-MCNC: 12 NG/L (ref 0–51)
WBC # BLD AUTO: 10 K/UL (ref 3.6–11)

## 2025-03-16 PROCEDURE — 83735 ASSAY OF MAGNESIUM: CPT

## 2025-03-16 PROCEDURE — 6370000000 HC RX 637 (ALT 250 FOR IP): Performed by: HOSPITALIST

## 2025-03-16 PROCEDURE — 6360000002 HC RX W HCPCS: Performed by: HOSPITALIST

## 2025-03-16 PROCEDURE — 94761 N-INVAS EAR/PLS OXIMETRY MLT: CPT

## 2025-03-16 PROCEDURE — 2700000000 HC OXYGEN THERAPY PER DAY

## 2025-03-16 PROCEDURE — 84484 ASSAY OF TROPONIN QUANT: CPT

## 2025-03-16 PROCEDURE — 6360000002 HC RX W HCPCS: Performed by: INTERNAL MEDICINE

## 2025-03-16 PROCEDURE — 93306 TTE W/DOPPLER COMPLETE: CPT | Performed by: INTERNAL MEDICINE

## 2025-03-16 PROCEDURE — 82803 BLOOD GASES ANY COMBINATION: CPT

## 2025-03-16 PROCEDURE — 93306 TTE W/DOPPLER COMPLETE: CPT

## 2025-03-16 PROCEDURE — 6370000000 HC RX 637 (ALT 250 FOR IP): Performed by: INTERNAL MEDICINE

## 2025-03-16 PROCEDURE — 93010 ELECTROCARDIOGRAM REPORT: CPT | Performed by: INTERNAL MEDICINE

## 2025-03-16 PROCEDURE — 2500000003 HC RX 250 WO HCPCS: Performed by: HOSPITALIST

## 2025-03-16 PROCEDURE — 2580000003 HC RX 258: Performed by: HOSPITALIST

## 2025-03-16 PROCEDURE — 80048 BASIC METABOLIC PNL TOTAL CA: CPT

## 2025-03-16 PROCEDURE — 71275 CT ANGIOGRAPHY CHEST: CPT

## 2025-03-16 PROCEDURE — 36600 WITHDRAWAL OF ARTERIAL BLOOD: CPT

## 2025-03-16 PROCEDURE — 82962 GLUCOSE BLOOD TEST: CPT

## 2025-03-16 PROCEDURE — 93005 ELECTROCARDIOGRAM TRACING: CPT | Performed by: EMERGENCY MEDICINE

## 2025-03-16 PROCEDURE — 36415 COLL VENOUS BLD VENIPUNCTURE: CPT

## 2025-03-16 PROCEDURE — 2000000000 HC ICU R&B

## 2025-03-16 PROCEDURE — 6360000004 HC RX CONTRAST MEDICATION: Performed by: HOSPITALIST

## 2025-03-16 PROCEDURE — 85025 COMPLETE CBC W/AUTO DIFF WBC: CPT

## 2025-03-16 PROCEDURE — 83880 ASSAY OF NATRIURETIC PEPTIDE: CPT

## 2025-03-16 PROCEDURE — 2580000003 HC RX 258: Performed by: INTERNAL MEDICINE

## 2025-03-16 PROCEDURE — 94640 AIRWAY INHALATION TREATMENT: CPT

## 2025-03-16 RX ORDER — ALBUTEROL SULFATE 0.83 MG/ML
2.5 SOLUTION RESPIRATORY (INHALATION) EVERY 4 HOURS PRN
Status: DISCONTINUED | OUTPATIENT
Start: 2025-03-16 | End: 2025-03-16 | Stop reason: CLARIF

## 2025-03-16 RX ORDER — IOPAMIDOL 755 MG/ML
100 INJECTION, SOLUTION INTRAVASCULAR
Status: COMPLETED | OUTPATIENT
Start: 2025-03-16 | End: 2025-03-16

## 2025-03-16 RX ORDER — ALBUTEROL SULFATE 90 UG/1
2 INHALANT RESPIRATORY (INHALATION) EVERY 4 HOURS PRN
Status: DISCONTINUED | OUTPATIENT
Start: 2025-03-16 | End: 2025-03-28 | Stop reason: HOSPADM

## 2025-03-16 RX ORDER — HALOPERIDOL 5 MG/ML
2 INJECTION INTRAMUSCULAR EVERY 6 HOURS PRN
Status: DISCONTINUED | OUTPATIENT
Start: 2025-03-16 | End: 2025-03-16

## 2025-03-16 RX ORDER — OLANZAPINE 2.5 MG/1
2.5 TABLET, FILM COATED ORAL NIGHTLY
Status: DISCONTINUED | OUTPATIENT
Start: 2025-03-16 | End: 2025-03-28 | Stop reason: HOSPADM

## 2025-03-16 RX ORDER — HALOPERIDOL 1 MG/1
2 TABLET ORAL EVERY 6 HOURS PRN
Status: DISCONTINUED | OUTPATIENT
Start: 2025-03-16 | End: 2025-03-28 | Stop reason: HOSPADM

## 2025-03-16 RX ORDER — HALOPERIDOL 5 MG/ML
2 INJECTION INTRAMUSCULAR EVERY 6 HOURS PRN
Status: DISCONTINUED | OUTPATIENT
Start: 2025-03-16 | End: 2025-03-28 | Stop reason: HOSPADM

## 2025-03-16 RX ORDER — MIDODRINE HYDROCHLORIDE 5 MG/1
5 TABLET ORAL
Status: DISCONTINUED | OUTPATIENT
Start: 2025-03-16 | End: 2025-03-28 | Stop reason: HOSPADM

## 2025-03-16 RX ORDER — 0.9 % SODIUM CHLORIDE 0.9 %
500 INTRAVENOUS SOLUTION INTRAVENOUS ONCE
Status: DISCONTINUED | OUTPATIENT
Start: 2025-03-16 | End: 2025-03-28 | Stop reason: HOSPADM

## 2025-03-16 RX ORDER — 0.9 % SODIUM CHLORIDE 0.9 %
1000 INTRAVENOUS SOLUTION INTRAVENOUS ONCE
Status: COMPLETED | OUTPATIENT
Start: 2025-03-16 | End: 2025-03-16

## 2025-03-16 RX ADMIN — SODIUM CHLORIDE 1500 MG: 0.9 INJECTION, SOLUTION INTRAVENOUS at 18:19

## 2025-03-16 RX ADMIN — Medication 500 ML: at 10:31

## 2025-03-16 RX ADMIN — INSULIN LISPRO 2 UNITS: 100 INJECTION, SOLUTION INTRAVENOUS; SUBCUTANEOUS at 08:14

## 2025-03-16 RX ADMIN — MIDODRINE HYDROCHLORIDE 5 MG: 5 TABLET ORAL at 16:20

## 2025-03-16 RX ADMIN — SODIUM CHLORIDE, PRESERVATIVE FREE 10 ML: 5 INJECTION INTRAVENOUS at 21:32

## 2025-03-16 RX ADMIN — SODIUM CHLORIDE, PRESERVATIVE FREE 10 ML: 5 INJECTION INTRAVENOUS at 08:14

## 2025-03-16 RX ADMIN — MIDODRINE HYDROCHLORIDE 5 MG: 5 TABLET ORAL at 13:03

## 2025-03-16 RX ADMIN — ROSUVASTATIN CALCIUM 10 MG: 10 TABLET, FILM COATED ORAL at 21:31

## 2025-03-16 RX ADMIN — METOPROLOL SUCCINATE 50 MG: 50 TABLET, FILM COATED, EXTENDED RELEASE ORAL at 08:14

## 2025-03-16 RX ADMIN — SODIUM CHLORIDE 1000 ML: 9 INJECTION, SOLUTION INTRAVENOUS at 00:57

## 2025-03-16 RX ADMIN — DONEPEZIL HYDROCHLORIDE 10 MG: 5 TABLET ORAL at 21:31

## 2025-03-16 RX ADMIN — IOPAMIDOL 65 ML: 755 INJECTION, SOLUTION INTRAVENOUS at 04:24

## 2025-03-16 RX ADMIN — HALOPERIDOL LACTATE 2 MG: 5 INJECTION, SOLUTION INTRAMUSCULAR at 18:02

## 2025-03-16 RX ADMIN — OLANZAPINE 2.5 MG: 2.5 TABLET, FILM COATED ORAL at 21:31

## 2025-03-16 RX ADMIN — ONDANSETRON 4 MG: 2 INJECTION, SOLUTION INTRAMUSCULAR; INTRAVENOUS at 05:07

## 2025-03-16 RX ADMIN — INSULIN LISPRO 2 UNITS: 100 INJECTION, SOLUTION INTRAVENOUS; SUBCUTANEOUS at 12:04

## 2025-03-16 RX ADMIN — ALBUTEROL SULFATE 2 PUFF: 90 AEROSOL, METERED RESPIRATORY (INHALATION) at 03:25

## 2025-03-16 RX ADMIN — WATER 7.5 MG: 1 INJECTION INTRAMUSCULAR; INTRAVENOUS; SUBCUTANEOUS at 05:04

## 2025-03-16 RX ADMIN — METOPROLOL TARTRATE 5 MG: 5 INJECTION INTRAVENOUS at 03:03

## 2025-03-16 RX ADMIN — ENOXAPARIN SODIUM 40 MG: 100 INJECTION SUBCUTANEOUS at 09:09

## 2025-03-16 RX ADMIN — SODIUM CHLORIDE: 0.9 INJECTION, SOLUTION INTRAVENOUS at 18:15

## 2025-03-16 RX ADMIN — AZITHROMYCIN MONOHYDRATE 500 MG: 500 INJECTION, POWDER, LYOPHILIZED, FOR SOLUTION INTRAVENOUS at 21:28

## 2025-03-16 RX ADMIN — ACETAMINOPHEN 650 MG: 325 TABLET ORAL at 06:14

## 2025-03-16 ASSESSMENT — PAIN SCALES - GENERAL
PAINLEVEL_OUTOF10: 0

## 2025-03-16 NOTE — H&P
Hospitalist Admission Note    NAME: Rusty Nevarez   :  1945   MRN:  [id number]     Date/Time:  3/15/2025 8:21 PM    Patient PCP: José Manuel Coulter, DO    Please note that this dictation was completed with [employer], the computer voice recognition software.  Quite often unanticipated grammatical, syntax, homophones, and other interpretive errors are inadvertently transcribed by the computer software.  Please disregard these errors.  Please excuse any errors that have escaped final proofreading  ________________________________________________________________________    Given the patient's current clinical presentation, I have a high level of concern for decompensation if discharged from the emergency department.  Complex decision making was performed, which includes reviewing the patient's available past medical records, laboratory results, and x-ray films.       My assessment of this patient's clinical condition and my plan of care is as follows.    Assessment / Plan:    Sepsis, POA  WBC 16.4, , fever 100.3.    Possible uti, POA   Suspect RLL CAP  Cough, fever, chills  --IVF, cont rocephin.  Add azithrmycin.  Per  thinks she had redness as allergy to cefazolin and erythromycin.  Add urine culture, procalcitonin, RVP  --FU blood cultures    Hypokalemia 3.2  --replete with effervescent K 40meq.  Recheck AM with mag    Persistent afib with tachycardia  --cont metoprolol ER.  Not on AC due to hx SAH  --IV metoprolol prn    PreDM  --hold metformin in hospital.  Moderate SSI    HTN  --hold hctz  --cont metoprolol ER, candesartan.   reports pcp    CKD 3  --baseline unknown, was 1.79 , now 1.19    Hyperlipidemia  --cont crestor    Mild dementia  --cont donepezil.  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

## 2025-03-16 NOTE — PROGRESS NOTES
Physical Therapy Note:    PT order received and chart reviewed. Patient was transferred to ICU overnight after rapid response due to acute respiratory failure, Afib with RVR. Discussed with bedside RN who states patient currently hypotensive and is awaiting updates from provider for intervention. RN in agreement to hold therapy evaluation at this time.    Thank you,  Vilma Bess, PT, DPT

## 2025-03-16 NOTE — SIGNIFICANT EVENT
Notified by RN  BP 86/43, 96/45, 91/52.    Ordered 1L NS over 1 hour.  DC candesartan.  Hold parameters on toprol XL.    Kate Mitchell MD

## 2025-03-16 NOTE — ED NOTES
SBAR given to [facility] prior to transfer. Pt in gown with PIV x2 and belongings.

## 2025-03-16 NOTE — PROGRESS NOTES
[facility]  [address] [place] [address] [phone]      Hospitalist Progress Note      NAME: Rusty Nevarez   :  1945  MRM:  [id number]    Date of service: 3/16/2025  10:52 AM       Assessment and Plan:   Sepsis, POA  WBC 16.4, , fever 100.3.  Possible due to UTI.. Continue antibiotics and check urine and blood cultures.     2.  Cough, fever, chills, hypoxia.  Tmax 103.1.  Negative RVP.  Follow-up blood culture. Continue antibiotics empirically.  Wean oxygen as able (on 2 L currently)     3.  Persistent afib with tachycardia. cont metoprolol ER.  Not on AC due to hx SAH     4.  PreDM.  Hold metformin in hospital.  Moderate SSI     5.  HTN.  Hold hctz.  Cont metoprolol ER, candesartan.        6.  CKD 3.  Avoid nephrotoxic drugs and monitor     7.  Hyperlipidemia.  Cont crestor     8.  Mild dementia.  Cont donepezil.     9.  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  Follow with [facility] Dr. Berenice Marie at [facility]  Hydrocephalus s/p  shunt     10.  Liver lesion.  Will order MRI       Subjective:     Chief Complaint:: Patient was seen and examined as a follow up for sepsis.  Chart was reviewed.  Feels better    ROS:  (bold if positive, if negative)    Tolerating PT  Tolerating Diet     v   Objective:     Last 24hrs VS reviewed since prior progress note. Most recent are:    Vitals:    25 0900   BP: (!) 133/99   Pulse: (!) 106   Resp: 20   Temp:    SpO2: 92%     SpO2 Readings from Last 6 Encounters:   25 92%   25 99%   10/11/24 96%          Intake/Output Summary (Last 24 hours) at 3/16/2025 1052  Last data filed at 3/16/2025 0636  Gross per 24 hour   Intake 2045.75 ml   Output --   Net 2045.75 ml        Physical Exam:    Gen:   17 g  17 g Oral Daily PRN    acetaminophen (TYLENOL) tablet 650 mg  650 mg Oral Q6H PRN    Or    acetaminophen (TYLENOL) suppository 650 mg  650 mg Rectal Q6H PRN    [START ON 3/17/2025] cefTRIAXone (ROCEPHIN) 1,000 mg in sterile water 10 mL IV syringe  1,000 mg IntraVENous Q24H    donepezil (ARICEPT) tablet 10 mg  10 mg Oral Nightly    metoprolol succinate (TOPROL XL) extended release tablet 50 mg  50 mg Oral Daily    rosuvastatin (CRESTOR) tablet 10 mg  10 mg Oral Nightly    azithromycin (ZITHROMAX) 500 mg in sodium chloride 0.9 % 250 mL IVPB ([id number])  500 mg IntraVENous Nightly    glucose chewable tablet 16 g  4 tablet Oral PRN    dextrose bolus 10% 125 mL  125 mL IntraVENous PRN    Or    dextrose bolus 10% 250 mL  250 mL IntraVENous PRN    glucagon injection 1 mg  1 mg SubCUTAneous PRN    dextrose 10 % infusion   IntraVENous Continuous PRN    insulin lispro (HUMALOG,ADMELOG) injection vial 0-8 Units  0-8 Units SubCUTAneous 4x Daily AC & HS    metoprolol (LOPRESSOR) injection 5 mg  5 mg IntraVENous Q6H PRN        Lab Data Reviewed: (see below)  Lab Review:     Recent Labs     03/15/25  1823 03/16/25  0300   WBC 16.4* 10.0   HGB 10.2* 9.8*   HCT 29.3* 29.6*    175     Recent Labs     03/15/25  1823 03/16/25  0300    139   K 3.2* 3.5   CL 99 109*   CO2 23 22   BUN 20 19   MG  --  2.2   ALT 27  --      No results found for: \"GLUCPOC\"  No results for input(s): \"PH\", \"PCO2\", \"PO2\", \"HCO3\", \"FIO2\" in the last 72 hours.  No results for input(s): \"INR\" in the last 72 hours.  [unfilled]    I have reviewed notes of prior 24hr.    Other pertinent lab:     Total time: -35- minutes. I personally saw and examined the patient during this time period.  Greater than 50% of this time was spent in counseling and coordination of care.    I personally reviewed chart, notes, data and current medications in the medical record.  I have personally examined and treated the patient at bedside during this period.

## 2025-03-16 NOTE — ED NOTES
TRANSFER - OUT REPORT:    Verbal report given to Haley VILLARREAL on Rusty Nevarez  being transferred to [facility] for routine progression of patient care       Report consisted of patient's Situation, Background, Assessment and   Recommendations(SBAR).     Information from the following report(s) ED Encounter Summary, MAR, Recent Results, Med Rec Status, Neuro Assessment, and Event Log was reviewed with the receiving nurse.    [facility] Fall Assessment:    Presents to emergency department  because of falls (Syncope, seizure, or loss of consciousness): No  Age > 70: Yes  Altered Mental Status, Intoxication with alcohol or substance confusion (Disorientation, impaired judgment, poor safety awaremess, or inability to follow instructions): Yes  Impaired Mobility: Ambulates or transfers with assistive devices or assistance; Unable to ambulate or transer.: No  Nursing Judgement: Yes          Lines:   Peripheral IV 03/15/25 Left;Anterior Forearm (Active)       Peripheral IV 03/15/25 Right;Anterior Forearm (Active)        Opportunity for questions and clarification was provided.      Patient transported with:  Monitor

## 2025-03-16 NOTE — PROGRESS NOTES
[facility] Dosing Services: Antimicrobial Stewardship Daily Doc  Consult for antibiotic dosing of Vancomycin by Dr. Smyth  Indication: Bacteremia  Day of Therapy: 1    Ht Readings from Last 1 Encounters:   03/16/25 1.651 m (5' 5\")        Wt Readings from Last 1 Encounters:   03/16/25 59 kg (130 lb)      Vancomycin therapy:  Loading dose: Vancomycin 1500 mg x1 dose now/given  Maintenance dose: Vancomycin 750 mg IV every 24 hours   Dose calculated to approximate a           a. Target AUC/MALLIKA of 400-600          b. Trough of 15-20 mcg/mL   Last level: Will order a level within 24-48hrs of 1st maintenance dose  Plan: continue same  Dose administration notes: Doses given appropriately as scheduled    Other Antimicrobial   (not dosed by pharmacist) Zithromax 500 mg IV q24hr  Rocephin 1gm IV q24hr   Cultures 3/15: Urine: pending  3/15: Blood: pending  3/15: Blood:: 1/2 bottles GPC pending  3/15: Blood: PCR pending   Serum Creatinine Lab Results   Component Value Date/Time    CREATININE 1.29 03/16/2025 03:00 AM          Creatinine Clearance Estimated Creatinine Clearance: 32 mL/min (A) (based on SCr of 1.29 mg/dL (H)).     Temp Temp: 98.6 °F (37 °C) (Oral)       WBC Lab Results   Component Value Date/Time    WBC 10.0 03/16/2025 03:00 AM          Procalcitonin No results found for: \"PROCAL\"   For Antifungals, Metronidazole and Nafcillin: Lab Results   Component Value Date/Time    ALT 27 03/15/2025 06:23 PM    AST 26 03/15/2025 06:23 PM        Pharmacist: NAILA MOHAN, [facility]  [phone]

## 2025-03-16 NOTE — PROGRESS NOTES
Occupational Therapy:  03/16/25    Orders received and appreciated, chart reviewed. Patient was transferred to ICU overnight after rapid response due to acute respiratory failure, Afib with RVR. Discussed with bedside RN who states patient currently hypotensive and is awaiting updates from provider for intervention. RN in agreement to hold therapy evaluation at this time. Will follow.     Thank you,  Flor Marte, OTR/L

## 2025-03-16 NOTE — PLAN OF CARE
Problem: Chronic Conditions and Co-morbidities  Goal: Patient's chronic conditions and co-morbidity symptoms are monitored and maintained or improved  Outcome: Progressing     Problem: Safety - Adult  Goal: Free from fall injury  Outcome: Progressing     Problem: Respiratory - Adult  Goal: Achieves optimal ventilation and oxygenation  3/16/2025 1327 by Kim Miranda RN  Outcome: Progressing  3/16/2025 0422 by Pardeep Sim  Outcome: Progressing     Problem: Discharge Planning  Goal: Discharge to home or other facility with appropriate resources  Outcome: Progressing     Problem: Skin/Tissue Integrity  Goal: Skin integrity remains intact  Description: 1.  Monitor for areas of redness and/or skin breakdown  2.  Assess vascular access sites hourly  3.  Every 4-6 hours minimum:  Change oxygen saturation probe site  4.  Every 4-6 hours:  If on nasal continuous positive airway pressure, respiratory therapy assess nares and determine need for appliance change or resting period  Outcome: Progressing

## 2025-03-16 NOTE — SIGNIFICANT EVENT
Reassessed patient after rapid response for hypoxia.  Currently on 8L midflow    Gen awake, alert, confused  Neck -- no JVD  Chest -- bibasilar crackles, frequent dry cough, no accessory muscle use, no wheezing or rhonchi  CV -- irregular rhythm, not tachycardic, no edema  Abd -- soft, NT  Skin --dry, back is hot to touch    ABG on 8L:  7.51/28/126/22 98.8%    Imp:    Acute hypoxic respiratory failure due to CAP  Respiratory alkalosis  Atrial fib with occasional RVR    Plan  --transfer to stepdown.  Wean down O2  --CTA chest  --hold on IVF, add probnp to AM labs  -- notified of event and care plan by phone.      Kate Mitchell MD

## 2025-03-16 NOTE — PROGRESS NOTES
Rapid Called at 0242    Responded to RRT at 0242 for Hypoxia    Provider at bedside: Yes  Interventions ordered: EKG, ABG, and Other (Comment)  Sepsis Suspected: Yes, admit from ED with Sepsis  Transfer to Higher Level of Care: Not at this time      Alled to patient bedside for elevated HR, patient sitting on bedside commode, patient is awake, shaking.  Patient assisted back to bed, Sa)2, 80 on RA.  Patient placed on 6L O2 with improved SaO2.  Patient desalted quickly placed on !00% NRB mask with SaO2 at 100%.  Attempted to wean patient O2 back to NC.  Patient was unable to tolerate placed 100% NRB mask.    EKG performed, AM labs obtained.  Neb treatment ordered.  Called YOUSIF Monsalve NP bedside.    ABG ordered, Midflow and CT chest.      Vitals:    03/16/25 0333   BP: (!) 116/57   Pulse: 91   Resp:    Temp:    SpO2: 98%        Rapid Ended at 0349      BALAJI BHAT RN

## 2025-03-16 NOTE — PROGRESS NOTES
1231hrs-t/c to Dr. Smyth d/t patient's blood pressure 93/47after 500cc bolus. New orders received awaiting pharmacy to verify order.    1615hrs-patient got out of bed took her clothes off and pulled her IV out with blood all over the room. Pt stating,\"I need this shit off of me!\" Contact made with Dr. Smyth for an intervention. Charge nurse made aware, pt will need a sitter overnight. She continue to take oxygen off.

## 2025-03-16 NOTE — FLOWSHEET NOTE
Pt arrived to the unit from the 5th floor. Pt was transferred to the ICU bed and placed on the monitor. Pt has her personal belongings including her glasses, upper dentures, clothes, and ring.  at bedside.

## 2025-03-17 ENCOUNTER — APPOINTMENT (OUTPATIENT)
Facility: HOSPITAL | Age: 80
DRG: 871 | End: 2025-03-17
Payer: MEDICARE

## 2025-03-17 LAB
COMMENT:: NORMAL
EKG ATRIAL RATE: 118 BPM
EKG ATRIAL RATE: 118 BPM
EKG DIAGNOSIS: NORMAL
EKG DIAGNOSIS: NORMAL
EKG P AXIS: 78 DEGREES
EKG P AXIS: 83 DEGREES
EKG P-R INTERVAL: 124 MS
EKG P-R INTERVAL: 126 MS
EKG Q-T INTERVAL: 288 MS
EKG Q-T INTERVAL: 304 MS
EKG QRS DURATION: 50 MS
EKG QRS DURATION: 56 MS
EKG QTC CALCULATION (BAZETT): 403 MS
EKG QTC CALCULATION (BAZETT): 426 MS
EKG R AXIS: 61 DEGREES
EKG R AXIS: 70 DEGREES
EKG T AXIS: 57 DEGREES
EKG T AXIS: 72 DEGREES
EKG VENTRICULAR RATE: 118 BPM
EKG VENTRICULAR RATE: 118 BPM
GLUCOSE BLD STRIP.AUTO-MCNC: 125 MG/DL (ref 65–117)
GLUCOSE BLD STRIP.AUTO-MCNC: 146 MG/DL (ref 65–117)
GLUCOSE BLD STRIP.AUTO-MCNC: 150 MG/DL (ref 65–117)
GLUCOSE BLD STRIP.AUTO-MCNC: 169 MG/DL (ref 65–117)
SERVICE CMNT-IMP: ABNORMAL
SPECIMEN HOLD: NORMAL

## 2025-03-17 PROCEDURE — 6370000000 HC RX 637 (ALT 250 FOR IP): Performed by: HOSPITALIST

## 2025-03-17 PROCEDURE — 2500000003 HC RX 250 WO HCPCS: Performed by: HOSPITALIST

## 2025-03-17 PROCEDURE — 97535 SELF CARE MNGMENT TRAINING: CPT

## 2025-03-17 PROCEDURE — 6360000002 HC RX W HCPCS: Performed by: HOSPITALIST

## 2025-03-17 PROCEDURE — 2700000000 HC OXYGEN THERAPY PER DAY

## 2025-03-17 PROCEDURE — 2580000003 HC RX 258: Performed by: INTERNAL MEDICINE

## 2025-03-17 PROCEDURE — 97116 GAIT TRAINING THERAPY: CPT | Performed by: PHYSICAL THERAPIST

## 2025-03-17 PROCEDURE — 6360000002 HC RX W HCPCS: Performed by: INTERNAL MEDICINE

## 2025-03-17 PROCEDURE — 6360000004 HC RX CONTRAST MEDICATION: Performed by: INTERNAL MEDICINE

## 2025-03-17 PROCEDURE — 93010 ELECTROCARDIOGRAM REPORT: CPT | Performed by: INTERNAL MEDICINE

## 2025-03-17 PROCEDURE — 36415 COLL VENOUS BLD VENIPUNCTURE: CPT

## 2025-03-17 PROCEDURE — 2580000003 HC RX 258: Performed by: HOSPITALIST

## 2025-03-17 PROCEDURE — 82962 GLUCOSE BLOOD TEST: CPT

## 2025-03-17 PROCEDURE — 87040 BLOOD CULTURE FOR BACTERIA: CPT

## 2025-03-17 PROCEDURE — 6370000000 HC RX 637 (ALT 250 FOR IP): Performed by: INTERNAL MEDICINE

## 2025-03-17 PROCEDURE — 97161 PT EVAL LOW COMPLEX 20 MIN: CPT | Performed by: PHYSICAL THERAPIST

## 2025-03-17 PROCEDURE — 1100000000 HC RM PRIVATE

## 2025-03-17 PROCEDURE — 97165 OT EVAL LOW COMPLEX 30 MIN: CPT

## 2025-03-17 PROCEDURE — 74177 CT ABD & PELVIS W/CONTRAST: CPT

## 2025-03-17 RX ORDER — CETIRIZINE HYDROCHLORIDE 10 MG/1
5 TABLET ORAL DAILY
Status: DISCONTINUED | OUTPATIENT
Start: 2025-03-17 | End: 2025-03-28 | Stop reason: HOSPADM

## 2025-03-17 RX ORDER — IOPAMIDOL 755 MG/ML
100 INJECTION, SOLUTION INTRAVASCULAR
Status: COMPLETED | OUTPATIENT
Start: 2025-03-17 | End: 2025-03-17

## 2025-03-17 RX ADMIN — IOPAMIDOL 100 ML: 755 INJECTION, SOLUTION INTRAVENOUS at 13:12

## 2025-03-17 RX ADMIN — SODIUM CHLORIDE, PRESERVATIVE FREE 10 ML: 5 INJECTION INTRAVENOUS at 20:10

## 2025-03-17 RX ADMIN — MIDODRINE HYDROCHLORIDE 5 MG: 5 TABLET ORAL at 17:36

## 2025-03-17 RX ADMIN — WATER 1000 MG: 1 INJECTION INTRAMUSCULAR; INTRAVENOUS; SUBCUTANEOUS at 21:03

## 2025-03-17 RX ADMIN — METOPROLOL SUCCINATE 50 MG: 50 TABLET, FILM COATED, EXTENDED RELEASE ORAL at 08:38

## 2025-03-17 RX ADMIN — ROSUVASTATIN CALCIUM 10 MG: 10 TABLET, FILM COATED ORAL at 21:03

## 2025-03-17 RX ADMIN — ENOXAPARIN SODIUM 40 MG: 100 INJECTION SUBCUTANEOUS at 08:38

## 2025-03-17 RX ADMIN — MIDODRINE HYDROCHLORIDE 5 MG: 5 TABLET ORAL at 08:38

## 2025-03-17 RX ADMIN — CETIRIZINE HYDROCHLORIDE 5 MG: 10 TABLET, FILM COATED ORAL at 12:19

## 2025-03-17 RX ADMIN — SODIUM CHLORIDE, PRESERVATIVE FREE 10 ML: 5 INJECTION INTRAVENOUS at 08:39

## 2025-03-17 RX ADMIN — OLANZAPINE 2.5 MG: 2.5 TABLET, FILM COATED ORAL at 21:27

## 2025-03-17 RX ADMIN — DONEPEZIL HYDROCHLORIDE 10 MG: 5 TABLET ORAL at 21:03

## 2025-03-17 RX ADMIN — MIDODRINE HYDROCHLORIDE 5 MG: 5 TABLET ORAL at 12:19

## 2025-03-17 RX ADMIN — AZITHROMYCIN MONOHYDRATE 500 MG: 500 INJECTION, POWDER, LYOPHILIZED, FOR SOLUTION INTRAVENOUS at 21:09

## 2025-03-17 RX ADMIN — ACETAMINOPHEN 650 MG: 325 TABLET ORAL at 17:46

## 2025-03-17 RX ADMIN — VANCOMYCIN HYDROCHLORIDE 750 MG: 750 INJECTION, POWDER, LYOPHILIZED, FOR SOLUTION INTRAVENOUS at 17:34

## 2025-03-17 ASSESSMENT — PAIN SCALES - GENERAL
PAINLEVEL_OUTOF10: 0

## 2025-03-17 NOTE — CARE COORDINATION
3/17/25  11:51 AM        Care Management Initial Assessment  3/17/2025 11:51 AM  If patient is discharged prior to next notation, then this note serves as note for discharge by case management.    Reason for Admission:   Dehydration [E86.0]  Confusion [R41.0]  Flu-like symptoms [R68.89]  Sepsis (HCC) [A41.9]  Sepsis, due to unspecified organism, unspecified whether acute organ dysfunction present (HCC) [A41.9]         Patient Admission Status: Inpatient  Date Admitted to INP: 3/15/25  RUR: Readmission Risk Score: 18.6    Hospitalization in the last 30 days (Readmission):  No        Advance Care Planning:  Code Status: Full Code  Primary Healthcare Decision Maker: (P) Legal Next of Kin   Advance Directive: NOK- Spouse- Chandra Drakeraghav     __________________________________________________________________________  Assessment:      03/17/25 1150   Service Assessment   Patient Orientation Person   Cognition Alert   History Provided By Significant Other   Primary Caregiver Self   Support Systems Spouse/Significant Other;Children;Family Members   Patient's Healthcare Decision Maker is: Legal Next of Kin   PCP Verified by CM Yes   Last Visit to PCP Within last 3 months   Prior Functional Level Independent in ADLs/IADLs   Current Functional Level Independent in ADLs/IADLs   Can patient return to prior living arrangement Yes   Ability to make needs known: Good   Family able to assist with home care needs: Yes   Would you like for me to discuss the discharge plan with any other family members/significant others, and if so, who? No   Social/Functional History   Lives With Spouse   Type of Home House   Home Layout Two level   Home Access Stairs to enter with rails   Entrance Stairs - Number of Steps 4   Entrance Stairs - Rails Both   Bathroom Shower/Tub Walk-in shower   Home Equipment None   Prior Level of Assist for ADLs Independent   Prior Level of Assist for Homemaking Independent   Ambulation Assistance Independent   Prior

## 2025-03-17 NOTE — PROGRESS NOTES
Spiritual Health History and Assessment/Progress Note  [facility]    Initial Encounter,  ,  ,      Name: Rusty Nevarez MRN: [id number]    Age: 79 y.o.     Sex: female   Language: English   Sabianism: Sikhism   Sepsis (HCC)     Date: 3/17/2025            Total Time Calculated: 18 min              Spiritual Assessment began in [facility] A[facility] INTENSIVE CARE UNIT        Referral/Consult From: Rounding   Encounter Overview/Reason: Initial Encounter  Service Provided For: Patient and family together    Nancy, Belief, Meaning:   Patient identifies as spiritual, has beliefs or practices that help with coping during difficult times, and Other: Identifies as Sikhism  Family/Friends identify as spiritual and have beliefs or practices that help with coping during difficult times      Importance and Influence:  Patient has spiritual/personal beliefs that influence decisions regarding their health  Family/Friends have spiritual/personal beliefs that influence decisions regarding the patient's health    Community:  Patient feels well-supported. Support system includes: Spouse/Partner  Family/Friends feel well-supported. Support system includes: Extended family    Assessment and Plan of Care:     Patient Interventions include: Facilitated expression of thoughts and feelings, Explored spiritual coping/struggle/distress, Affirmed coping skills/support systems, and Other: A sitter was present at time of visit. Provided spiritual presence and active listening as patient shared that she was doing well; she offered no concerns at that time. She shared about a meaningful spiritual experience she had in 2015 at time of brain bleed. Acknowledged her feelings and offered words of support. She shared that she prayed several times a day and whenever she was facing difficulties. With her permission, had prayer on behalf of patient and family and assured them of ongoing  availability for support.  Family/Friends

## 2025-03-17 NOTE — PLAN OF CARE
Problem: Occupational Therapy - Adult  Goal: By Discharge: Performs self-care activities at highest level of function for planned discharge setting.  See evaluation for individualized goals.  Description: FUNCTIONAL STATUS PRIOR TO ADMISSION:  Patient is normally independent with ADLs and mobility without use of AD/DME.  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  No home O2 use.     HOME SUPPORT: Patient lived with her . Has a very supportive daughter who lives locally as well.    Occupational Therapy Goals:  Initiated 3/17/2025  1.  Patient will perform grooming, standing at sink, with Modified [place] within 7 day(s).  2.  Patient will perform lower body dressing with Supervision within 7 day(s).  3.  Patient will perform bathing from neck to knees with Supervision within 7 day(s).  4.  Patient will perform toilet transfers with Supervision  within 7 day(s).  5.  Patient will perform all aspects of toileting with Supervision within 7 day(s).  6.  Patient will participate in upper extremity therapeutic exercise/activities with Supervision for 10 minutes within 7 day(s).    7.  Patient will utilize energy conservation techniques during functional activities with verbal cues within 7 day(s).  Outcome: Progressing     OCCUPATIONAL THERAPY EVALUATION    Patient: Rusty Nevarez (79 y.o. female)  Date: 3/17/2025  Primary Diagnosis: Dehydration [E86.0]  Confusion [R41.0]  Flu-like symptoms [R68.89]  Sepsis (HCC) [A41.9]  Sepsis, due to unspecified organism, unspecified whether acute organ dysfunction present (HCC) [A41.9]         Precautions: Fall Risk, Bed Alarm                  ASSESSMENT :  The patient is limited by decreased functional mobility, independence in ADLs, high-level IADLs, strength, activity tolerance, endurance, safety awareness, cognition, attention/concentration, balance on day 2 of hospital admission for feeling ill with fever,

## 2025-03-17 NOTE — PROGRESS NOTES
TRANSFER - OUT REPORT:    Verbal report given to PHIL Watson on Rusty Nevarez  being transferred to [facility] for routine progression of patient care       Report consisted of patient's Situation, Background, Assessment and   Recommendations(SBAR).     Information from the following report(s) Nurse Handoff Report, Cardiac Rhythm NSR with frequent PACs, and Neuro Assessment was reviewed with the receiving nurse.    Lines:   Peripheral IV 03/16/25 Left;Posterior Forearm (Active)   Site Assessment Clean, dry & intact 03/17/25 1600   Line Status Infusing 03/17/25 1600   Line Care Connections checked and tightened 03/17/25 1600   Phlebitis Assessment No symptoms 03/17/25 1600   Infiltration Assessment 0 03/17/25 1600   Alcohol Cap Used Yes 03/17/25 1600   Dressing Status Clean, dry & intact 03/17/25 1600   Dressing Type Transparent 03/17/25 1600   Dressing Intervention New 03/16/25 1628        Opportunity for questions and clarification was provided.      Patient transported with:  Monitor, O2 @ 3lpm, Patient-specific medications from Pharmacy, Registered Nurse, and Tech    Pt transported with all belongings present in room (see belongings flowsheet), pt also transported with sitter and left in room with sitter at bedside. Pt meds from pharmacy transported with patient to [facility] room.

## 2025-03-17 NOTE — PLAN OF CARE
Problem: Physical Therapy - Adult  Goal: By Discharge: Performs mobility at highest level of function for planned discharge setting.  See evaluation for individualized goals.  Description: FUNCTIONAL STATUS PRIOR TO ADMISSION: Patient was independent and active without use of DME.    HOME SUPPORT PRIOR TO ADMISSION: The patient lived with  has supportive daughter involved as well.    Physical Therapy Goals  Initiated 3/17/2025  1.  Patient will move from supine to sit and sit to supine in bed with modified independence within 7 day(s).    2.  Patient will perform sit to stand with modified independence within 7 day(s).  3.  Patient will transfer from bed to chair and chair to bed with modified independence using the least restrictive device within 7 day(s).  4.  Patient will ambulate with supervision/set-up for 200 feet with the least restrictive device within 7 day(s).   5.  Patient will ascend/descend 4 stairs with 1 handrail(s) with contact guard assist within 7 day(s).  Outcome: Progressing   PHYSICAL THERAPY EVALUATION    Patient: Rusty Nevarez (79 y.o. female)  Date: 3/17/2025  Primary Diagnosis: Dehydration [E86.0]  Confusion [R41.0]  Flu-like symptoms [R68.89]  Sepsis (HCC) [A41.9]  Sepsis, due to unspecified organism, unspecified whether acute organ dysfunction present (HCC) [A41.9]       Precautions: Restrictions/Precautions  Restrictions/Precautions: Fall Risk, Bed Alarm            ASSESSMENT :   DEFICITS/IMPAIRMENTS:   The patient is limited by impaired balance, gait instability, generalized weakness, confusion and decreased activity tolerance.  Currently patient requires Isaias for bed mobility and Isaias for transfers.  Able to amb into the bathroom and then back around bed to the chair with Isaias via HHA.  Patient is mildly unsteady and requires physical assistance to maintain safety with amb.  Anticipate patient would do better with RW next session.      Based on the impairments listed above  patient is below her functional baseline.  May benefit from moderate level rehab to continue mobility progression at current level of function.  Hopeful patient can improve to go home with HH PT and family support.    Patient will benefit from skilled intervention to address the above impairments.         PLAN :  Recommendations and Planned Interventions:   bed mobility training, transfer training, gait training, therapeutic exercises, neuromuscular re-education, patient and family training/education, and therapeutic activities    Frequency/Duration: Patient will be followed by physical therapy to address goals, PT Plan of Care: 5 times/week to address goals.        Recommendation for discharge: (in order for the patient to meet his/her long term goals):   Moderate intensity short-term skilled physical therapy up to 5x/week    Other factors to consider for discharge: impaired cognition, high risk for falls, not safe to be alone, and concern for safely navigating or managing the home environment    IF patient discharges home will need the following DME: rolling walker (potentially)                SUBJECTIVE:   Patient stated “I would do better if I could walk to the bathroom.”    OBJECTIVE DATA SUMMARY:       Past Medical History:   Diagnosis Date    Atrial fibrillation (HCC)     CKD (chronic kidney disease)     Hydrocephalus (HCC)     Hyperlipidemia     Hypertension     Mild dementia (HCC)     Prediabetes     Subarachnoid bleed (HCC)     due to aneurysm     Past Surgical History:   Procedure Laterality Date    BRAIN ANEURYSM SURGERY      VENTRICULOPERITONEAL SHUNT         Home Situation:  Social/Functional History  Lives With: Spouse  Type of Home: House  Home Layout: Two level  Home Access: Stairs to enter with rails  Entrance Stairs - Number of Steps: 4  Entrance Stairs - Rails: Both  Bathroom Shower/Tub: Walk-in shower  Home Equipment: None  Prior Level of Assist for ADLs: Independent  Prior Level of Assist for

## 2025-03-17 NOTE — PROGRESS NOTES
Intake/Output Summary (Last 24 hours) at 3/17/2025 0822  Last data filed at 3/17/2025 0333  Gross per 24 hour   Intake 237.73 ml   Output --   Net 237.73 ml        Physical Exam:    Gen:  Well-developed, well-nourished, in no acute distress  HEENT:  Pink conjunctivae, PERRL, hearing intact to voice, moist mucous membranes  Neck:  Supple, without masses, thyroid non-tender  Resp:  No accessory muscle use, clear breath sounds without wheezes rales or rhonchi  Card:  No murmurs, irregular S1, S2 without thrills, bruits or peripheral edema  Abd:  Soft, non-tender, non-distended, normoactive bowel sounds are present, no palpable organomegaly and no detectable hernias  Lymph:  No cervical or inguinal adenopathy  Musc:  No cyanosis or clubbing  Skin:  No rashes or ulcers, skin turgor is good  Neuro:  Cranial nerves are grossly intact, no focal motor weakness, follows commands appropriately  Psych: Poor insight,    __________________________________________________________________  Medications Reviewed: (see below)  Medications:     Current Facility-Administered Medications   Medication Dose Route Frequency    albuterol sulfate HFA (PROVENTIL;VENTOLIN;PROAIR) 108 (90 Base) MCG/ACT inhaler 2 puff  2 puff Inhalation Q4H PRN    sodium chloride 0.9 % bolus 500 mL  500 mL IntraVENous Once    midodrine (PROAMATINE) tablet 5 mg  5 mg Oral TID WC    haloperidol (HALDOL) tablet 2 mg  2 mg Oral Q6H PRN    vancomycin (VANCOCIN) 750 mg in sodium chloride 0.9 % 250 mL IVPB ([id number])  750 mg IntraVENous Q24H    haloperidol lactate (HALDOL) injection 2 mg  2 mg IntraVENous Q6H PRN    OLANZapine (ZYPREXA) tablet 2.5 mg  2.5 mg Oral Nightly    sodium chloride flush 0.9 % injection 5-40 mL  5-40 mL IntraVENous 2 times per day    sodium chloride flush 0.9 % injection 5-40 mL  5-40 mL IntraVENous PRN    0.9 % sodium chloride infusion   IntraVENous PRN    potassium chloride (KLOR-CON) extended release tablet 40 mEq  40 mEq Oral PRN    Or     potassium bicarb-citric acid (EFFER-K) effervescent tablet 40 mEq  40 mEq Oral PRN    Or    potassium chloride 10 mEq/100 mL IVPB (Peripheral Line)  10 mEq IntraVENous PRN    magnesium sulfate 2000 mg in 50 mL IVPB premix  2,000 mg IntraVENous PRN    enoxaparin (LOVENOX) injection 40 mg  40 mg SubCUTAneous Daily    ondansetron (ZOFRAN-ODT) disintegrating tablet 4 mg  4 mg Oral Q8H PRN    Or    ondansetron (ZOFRAN) injection 4 mg  4 mg IntraVENous Q6H PRN    polyethylene glycol (GLYCOLAX) packet 17 g  17 g Oral Daily PRN    acetaminophen (TYLENOL) tablet 650 mg  650 mg Oral Q6H PRN    Or    acetaminophen (TYLENOL) suppository 650 mg  650 mg Rectal Q6H PRN    cefTRIAXone (ROCEPHIN) 1,000 mg in sterile water 10 mL IV syringe  1,000 mg IntraVENous Q24H    donepezil (ARICEPT) tablet 10 mg  10 mg Oral Nightly    metoprolol succinate (TOPROL XL) extended release tablet 50 mg  50 mg Oral Daily    rosuvastatin (CRESTOR) tablet 10 mg  10 mg Oral Nightly    azithromycin (ZITHROMAX) 500 mg in sodium chloride 0.9 % 250 mL IVPB ([id number])  500 mg IntraVENous Nightly    glucose chewable tablet 16 g  4 tablet Oral PRN    dextrose bolus 10% 125 mL  125 mL IntraVENous PRN    Or    dextrose bolus 10% 250 mL  250 mL IntraVENous PRN    glucagon injection 1 mg  1 mg SubCUTAneous PRN    dextrose 10 % infusion   IntraVENous Continuous PRN    insulin lispro (HUMALOG,ADMELOG) injection vial 0-8 Units  0-8 Units SubCUTAneous 4x Daily AC & HS    metoprolol (LOPRESSOR) injection 5 mg  5 mg IntraVENous Q6H PRN        Lab Data Reviewed: (see below)  Lab Review:     Recent Labs     03/15/25  1823 03/16/25  0300   WBC 16.4* 10.0   HGB 10.2* 9.8*   HCT 29.3* 29.6*    175     Recent Labs     03/15/25  1823 03/16/25  0300    139   K 3.2* 3.5   CL 99 109*   CO2 23 22   BUN 20 19   MG  --  2.2   ALT 27  --      No results found for: \"GLUCPOC\"  No results for input(s): \"PH\", \"PCO2\", \"PO2\", \"HCO3\", \"FIO2\" in the last 72 hours.  No results

## 2025-03-18 ENCOUNTER — APPOINTMENT (OUTPATIENT)
Facility: HOSPITAL | Age: 80
DRG: 871 | End: 2025-03-18
Payer: MEDICARE

## 2025-03-18 PROBLEM — Z86.79 HX OF SUBARACHNOID HEMORRHAGE: Status: ACTIVE | Noted: 2025-03-18

## 2025-03-18 PROBLEM — N39.0 URINARY TRACT INFECTION: Status: ACTIVE | Noted: 2025-03-18

## 2025-03-18 PROBLEM — K76.9 LIVER LESION: Status: ACTIVE | Noted: 2025-03-18

## 2025-03-18 PROBLEM — I48.0 PAF (PAROXYSMAL ATRIAL FIBRILLATION) (HCC): Status: ACTIVE | Noted: 2025-03-18

## 2025-03-18 PROBLEM — B95.5 BACTEREMIA DUE TO STREPTOCOCCUS: Status: ACTIVE | Noted: 2025-03-18

## 2025-03-18 PROBLEM — R78.81 BACTEREMIA DUE TO STREPTOCOCCUS: Status: ACTIVE | Noted: 2025-03-18

## 2025-03-18 LAB
ANION GAP SERPL CALC-SCNC: 9 MMOL/L (ref 2–12)
BACTERIA SPEC CULT: ABNORMAL
BACTERIA SPEC CULT: ABNORMAL
BASOPHILS # BLD: 0 K/UL (ref 0–0.1)
BASOPHILS NFR BLD: 0 % (ref 0–1)
BUN SERPL-MCNC: 22 MG/DL (ref 6–20)
BUN/CREAT SERPL: 20 (ref 12–20)
CALCIUM SERPL-MCNC: 7.2 MG/DL (ref 8.5–10.1)
CHLORIDE SERPL-SCNC: 109 MMOL/L (ref 97–108)
CO2 SERPL-SCNC: 22 MMOL/L (ref 21–32)
CREAT SERPL-MCNC: 1.1 MG/DL (ref 0.55–1.02)
DIFFERENTIAL METHOD BLD: ABNORMAL
EOSINOPHIL # BLD: 0.24 K/UL (ref 0–0.4)
EOSINOPHIL NFR BLD: 1 % (ref 0–7)
ERYTHROCYTE [DISTWIDTH] IN BLOOD BY AUTOMATED COUNT: 15.6 % (ref 11.5–14.5)
GLUCOSE BLD STRIP.AUTO-MCNC: 141 MG/DL (ref 65–117)
GLUCOSE BLD STRIP.AUTO-MCNC: 144 MG/DL (ref 65–117)
GLUCOSE BLD STRIP.AUTO-MCNC: 155 MG/DL (ref 65–117)
GLUCOSE SERPL-MCNC: 132 MG/DL (ref 65–100)
HCT VFR BLD AUTO: 27.5 % (ref 35–47)
HGB BLD-MCNC: 9.4 G/DL (ref 11.5–16)
IMM GRANULOCYTES # BLD AUTO: 0 K/UL
IMM GRANULOCYTES NFR BLD AUTO: 0 %
LYMPHOCYTES # BLD: 2.61 K/UL (ref 0.8–3.5)
LYMPHOCYTES NFR BLD: 11 % (ref 12–49)
MCH RBC QN AUTO: 29.5 PG (ref 26–34)
MCHC RBC AUTO-ENTMCNC: 34.2 G/DL (ref 30–36.5)
MCV RBC AUTO: 86.2 FL (ref 80–99)
MONOCYTES # BLD: 0.71 K/UL (ref 0–1)
MONOCYTES NFR BLD: 3 % (ref 5–13)
NEUTS BAND NFR BLD MANUAL: 3 % (ref 0–6)
NEUTS SEG # BLD: 20.14 K/UL (ref 1.8–8)
NEUTS SEG NFR BLD: 82 % (ref 32–75)
NRBC # BLD: 0 K/UL (ref 0–0.01)
NRBC BLD-RTO: 0 PER 100 WBC
PLATELET # BLD AUTO: 222 K/UL (ref 150–400)
PMV BLD AUTO: 12.7 FL (ref 8.9–12.9)
POTASSIUM SERPL-SCNC: 3.5 MMOL/L (ref 3.5–5.1)
RBC # BLD AUTO: 3.19 M/UL (ref 3.8–5.2)
RBC MORPH BLD: ABNORMAL
SERVICE CMNT-IMP: ABNORMAL
SODIUM SERPL-SCNC: 140 MMOL/L (ref 136–145)
VANCOMYCIN SERPL-MCNC: 14.2 UG/ML
WBC # BLD AUTO: 23.7 K/UL (ref 3.6–11)

## 2025-03-18 PROCEDURE — 2580000003 HC RX 258: Performed by: INTERNAL MEDICINE

## 2025-03-18 PROCEDURE — 82962 GLUCOSE BLOOD TEST: CPT

## 2025-03-18 PROCEDURE — 6370000000 HC RX 637 (ALT 250 FOR IP)

## 2025-03-18 PROCEDURE — 94761 N-INVAS EAR/PLS OXIMETRY MLT: CPT

## 2025-03-18 PROCEDURE — 85025 COMPLETE CBC W/AUTO DIFF WBC: CPT

## 2025-03-18 PROCEDURE — 2700000000 HC OXYGEN THERAPY PER DAY

## 2025-03-18 PROCEDURE — 6360000002 HC RX W HCPCS: Performed by: HOSPITALIST

## 2025-03-18 PROCEDURE — 97116 GAIT TRAINING THERAPY: CPT

## 2025-03-18 PROCEDURE — 6370000000 HC RX 637 (ALT 250 FOR IP): Performed by: INTERNAL MEDICINE

## 2025-03-18 PROCEDURE — 71045 X-RAY EXAM CHEST 1 VIEW: CPT

## 2025-03-18 PROCEDURE — 80048 BASIC METABOLIC PNL TOTAL CA: CPT

## 2025-03-18 PROCEDURE — 80202 ASSAY OF VANCOMYCIN: CPT

## 2025-03-18 PROCEDURE — 97110 THERAPEUTIC EXERCISES: CPT

## 2025-03-18 PROCEDURE — 6360000002 HC RX W HCPCS: Performed by: INTERNAL MEDICINE

## 2025-03-18 PROCEDURE — 97535 SELF CARE MNGMENT TRAINING: CPT

## 2025-03-18 PROCEDURE — 6370000000 HC RX 637 (ALT 250 FOR IP): Performed by: HOSPITALIST

## 2025-03-18 PROCEDURE — 97530 THERAPEUTIC ACTIVITIES: CPT

## 2025-03-18 PROCEDURE — 1100000000 HC RM PRIVATE

## 2025-03-18 PROCEDURE — 2500000003 HC RX 250 WO HCPCS: Performed by: INTERNAL MEDICINE

## 2025-03-18 RX ORDER — BUMETANIDE 0.25 MG/ML
1 INJECTION, SOLUTION INTRAMUSCULAR; INTRAVENOUS 2 TIMES DAILY
Status: DISPENSED | OUTPATIENT
Start: 2025-03-18 | End: 2025-03-19

## 2025-03-18 RX ORDER — METRONIDAZOLE 500 MG/100ML
500 INJECTION, SOLUTION INTRAVENOUS EVERY 8 HOURS
Status: DISCONTINUED | OUTPATIENT
Start: 2025-03-18 | End: 2025-03-20

## 2025-03-18 RX ORDER — BENZONATATE 100 MG/1
100 CAPSULE ORAL 3 TIMES DAILY PRN
Status: DISCONTINUED | OUTPATIENT
Start: 2025-03-18 | End: 2025-03-28 | Stop reason: HOSPADM

## 2025-03-18 RX ORDER — BUMETANIDE 0.25 MG/ML
1 INJECTION, SOLUTION INTRAMUSCULAR; INTRAVENOUS 2 TIMES DAILY
Status: DISCONTINUED | OUTPATIENT
Start: 2025-03-18 | End: 2025-03-18

## 2025-03-18 RX ADMIN — METRONIDAZOLE 500 MG: 500 INJECTION, SOLUTION INTRAVENOUS at 09:12

## 2025-03-18 RX ADMIN — CEFEPIME 2000 MG: 2 INJECTION, POWDER, FOR SOLUTION INTRAVENOUS at 19:55

## 2025-03-18 RX ADMIN — MIDODRINE HYDROCHLORIDE 5 MG: 5 TABLET ORAL at 16:57

## 2025-03-18 RX ADMIN — ROSUVASTATIN CALCIUM 10 MG: 10 TABLET, FILM COATED ORAL at 19:55

## 2025-03-18 RX ADMIN — MIDODRINE HYDROCHLORIDE 5 MG: 5 TABLET ORAL at 11:35

## 2025-03-18 RX ADMIN — WATER 2000 MG: 1 INJECTION INTRAMUSCULAR; INTRAVENOUS; SUBCUTANEOUS at 09:09

## 2025-03-18 RX ADMIN — METOPROLOL SUCCINATE 50 MG: 50 TABLET, FILM COATED, EXTENDED RELEASE ORAL at 09:09

## 2025-03-18 RX ADMIN — CETIRIZINE HYDROCHLORIDE 5 MG: 10 TABLET, FILM COATED ORAL at 09:09

## 2025-03-18 RX ADMIN — BUMETANIDE 1 MG: 0.25 INJECTION INTRAMUSCULAR; INTRAVENOUS at 18:05

## 2025-03-18 RX ADMIN — VANCOMYCIN HYDROCHLORIDE 1000 MG: 1 INJECTION, POWDER, LYOPHILIZED, FOR SOLUTION INTRAVENOUS at 18:05

## 2025-03-18 RX ADMIN — DONEPEZIL HYDROCHLORIDE 10 MG: 5 TABLET ORAL at 19:55

## 2025-03-18 RX ADMIN — MIDODRINE HYDROCHLORIDE 5 MG: 5 TABLET ORAL at 09:09

## 2025-03-18 RX ADMIN — ENOXAPARIN SODIUM 40 MG: 100 INJECTION SUBCUTANEOUS at 09:10

## 2025-03-18 RX ADMIN — METRONIDAZOLE 500 MG: 500 INJECTION, SOLUTION INTRAVENOUS at 16:57

## 2025-03-18 RX ADMIN — BENZONATATE 100 MG: 100 CAPSULE ORAL at 05:56

## 2025-03-18 NOTE — PLAN OF CARE
Problem: Occupational Therapy - Adult  Goal: By Discharge: Performs self-care activities at highest level of function for planned discharge setting.  See evaluation for individualized goals.  Description: FUNCTIONAL STATUS PRIOR TO ADMISSION:  Patient is normally independent with ADLs and mobility without use of AD/DME.  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  No home O2 use.     HOME SUPPORT: Patient lived with her . Has a very supportive daughter who lives locally as well.    Occupational Therapy Goals:  Initiated 3/17/2025  1.  Patient will perform grooming, standing at sink, with Modified [place] within 7 day(s).  2.  Patient will perform lower body dressing with Supervision within 7 day(s).  3.  Patient will perform bathing from neck to knees with Supervision within 7 day(s).  4.  Patient will perform toilet transfers with Supervision  within 7 day(s).  5.  Patient will perform all aspects of toileting with Supervision within 7 day(s).  6.  Patient will participate in upper extremity therapeutic exercise/activities with Supervision for 10 minutes within 7 day(s).    7.  Patient will utilize energy conservation techniques during functional activities with verbal cues within 7 day(s).  Outcome: Progressing   OCCUPATIONAL THERAPY TREATMENT  Patient: Rusty Nevarez (79 y.o. female)  Date: 3/18/2025  Primary Diagnosis: Dehydration [E86.0]  Confusion [R41.0]  Flu-like symptoms [R68.89]  Sepsis (HCC) [A41.9]  Sepsis, due to unspecified organism, unspecified whether acute organ dysfunction present (HCC) [A41.9]       Precautions: Fall Risk, Bed Alarm                Chart, occupational therapy assessment, plan of care, and goals were reviewed.    ASSESSMENT  Patient continues to benefit from skilled OT services and is progressing towards goals. Patient with improved functional activity tolerance, and demonstrates ADL related mobility and balance  daily tasks, spreading needed tasks throughout day/week as able  10.  Performing ADLS and IADLS seated PRN        Pain Ratin/10   Pain Intervention(s):         Activity Tolerance:   Fair , observed shortness of breath on exertion, and desaturates with activity and requires oxygen  Please refer to the flowsheet for vital signs taken during this treatment.    After treatment:   Left with PT at end of session    COMMUNICATION/EDUCATION:   The patient's plan of care was discussed with: physical therapist and registered nurse    Patient Education  Education Given To: Patient;Family  Education Provided: Role of Therapy;Plan of Care;Transfer Training;ADL Adaptive Strategies;Fall Prevention Strategies;Mobility Training;Energy Conservation  Education Provided Comments: emphasis on ECT and PLB; minimizing short inhales/accessory muscl breathing and instead focusing on efficient deep, coordinated breathing with activity  Education Method: Verbal;Demonstration;Teach Back  Barriers to Learning: Cognition  Education Outcome: Verbalized understanding;Continued education needed;Demonstrated understanding    Thank you for this referral.  Kimberly Diaz OT  Minutes: 27

## 2025-03-18 NOTE — PROGRESS NOTES
[facility] Dosing Services: Antimicrobial Stewardship Daily Doc  Consult for antibiotic dosing of Vancomycin by Dr. Smyth  Indication: Bacteremia d/t UTI  Day of Therapy: 3    Ht Readings from Last 1 Encounters:   03/16/25 1.651 m (5' 5\")        Wt Readings from Last 1 Encounters:   03/16/25 59 kg (130 lb)      Vancomycin therapy:  Loading dose: Vancomycin 1500 mg x1 dose given 3/16 @ 1819  Maintenance dose: Vancomycin 750 mg IV every 24 hours     Dose calculated to approximate a           a. Target AUC/MALLIKA of 400-600    Last level: 14.2 mcg/mL ~11h after the dose - sub-tx auc    Plan: WBC trended up to 23k, Scr stable, febrile yesterday evening. Will increase vanc dose to 1g q24h (~auc 485).     Dose administration notes: Doses given appropriately as scheduled    Other Antimicrobial   (not dosed by pharmacist) Zithromax 500 mg IV [id number]  Rocephin 2g   Cultures 3/15: Urine: >100,000 GNRs, probable strep (prelim)  3/15: Blood: strep in 2/2 bottles (prelim)  3/15 Blood: probable strep in both bottles (prelim)  3/15: Blood ID PCR: strep (final, no resistance)  3/17 Blood x2: ngtd - in process    Serum Creatinine Lab Results   Component Value Date/Time    CREATININE 1.10 03/18/2025 04:35 AM          Creatinine Clearance Estimated Creatinine Clearance: 38 mL/min (A) (based on SCr of 1.1 mg/dL (H)).     Temp Temp: 99.1 °F (37.3 °C) (Oral)       WBC Lab Results   Component Value Date/Time    WBC 23.7 03/18/2025 04:35 AM          Procalcitonin No results found for: \"PROCAL\"   For Antifungals, Metronidazole and Nafcillin: Lab Results   Component Value Date/Time    ALT 27 03/15/2025 06:23 PM    AST 26 03/15/2025 06:23 PM        Pharmacist: Ngozi Zarate, [facility]  [phone]

## 2025-03-18 NOTE — PLAN OF CARE
Problem: Physical Therapy - Adult  Goal: By Discharge: Performs mobility at highest level of function for planned discharge setting.  See evaluation for individualized goals.  Description: FUNCTIONAL STATUS PRIOR TO ADMISSION: Patient was independent and active without use of DME.    HOME SUPPORT PRIOR TO ADMISSION: The patient lived with  has supportive daughter involved as well.    Physical Therapy Goals  Initiated 3/17/2025  1.  Patient will move from supine to sit and sit to supine in bed with modified independence within 7 day(s).    2.  Patient will perform sit to stand with modified independence within 7 day(s).  3.  Patient will transfer from bed to chair and chair to bed with modified independence using the least restrictive device within 7 day(s).  4.  Patient will ambulate with supervision/set-up for 200 feet with the least restrictive device within 7 day(s).   5.  Patient will ascend/descend 4 stairs with 1 handrail(s) with contact guard assist within 7 day(s).  Outcome: Progressing     PHYSICAL THERAPY TREATMENT    Patient: Rusty Nevarez (79 y.o. female)  Date: 3/18/2025  Diagnosis: Dehydration [E86.0]  Confusion [R41.0]  Flu-like symptoms [R68.89]  Sepsis (HCC) [A41.9]  Sepsis, due to unspecified organism, unspecified whether acute organ dysfunction present (HCC) [A41.9] Sepsis (HCC)      Precautions: Restrictions/Precautions  Restrictions/Precautions: Fall Risk, Bed Alarm            ASSESSMENT:  Patient continues to benefit from skilled PT services and is progressing towards goals. Patient tolerated session well. Patient mobilized at CGA to SBA level including transfers and gait training. Patient required supplemental O2 to maintain stable SpO2 at 92% for activity. Patient engaged in dynamic gait challenges with dual task component with no notable LOB. Patient and  convey preference to return home where  can provide the adequate supervision and support. Recommend HHPT to

## 2025-03-18 NOTE — PROGRESS NOTES
[facility]  [address] [place] [address] [phone]      Hospitalist Progress Note      NAME: Rusty Nevarez   :  1945  MRM:  [id number]    Date of service: 3/18/2025  9:09 AM       Assessment and Plan:   Sepsis/bacteremia, POA  WBC 16.4, , fever 100.3 on admission.  Tmax 103.1.  Possible due to ?  Liver abscess.  BC: Streptococcus from 3/15.  Repeated blood culture is negative so far.  Continue antibiotics.  Consult ID    2.  Liver lesion: CT scan of the abdomen: Multiseptated hepatic lesion is suspicious for hepatic abscess with neoplastic process not excluded.  Cont broad spectrum Abxs. Consulted IR for biopsy(Bx on 3/19).     3.  UTI (POA).  UCx: GNR.  Continue antibiotic    4.  Cough, fever, chills, hypoxia.  Tmax 103.1.  Hx of bronchiectasis.  Negative RVP.  Follow-up blood culture. Continue antibiotics empirically.  Wean oxygen as able (on 2 L currently).  Repeat chest x-ray today shows pulmonary edema. Likely due to the fluid she received. Echo is unremarkable. Will give bumex x2 doses and re evaluate.      5.  Persistent afib with tachycardia. cont metoprolol ER.  Not on AC due to hx SAH     6.  PreDM.  Hold metformin in hospital.  Moderate SSI     7.  HTN.  Hold hctz.  Cont metoprolol ER, candesartan.        8.  CKD 3.  Avoid nephrotoxic drugs and monitor     9.  Hyperlipidemia.  Cont crestor     10.  Mild dementia.  Cont donepezil.     11.  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  Follow with [facility] Dr. Berenice Marie at [facility]  Hydrocephalus s/p  shunt     11.   Confusion/sundowning.  Continue Zyprexa and Haldol as needed    Discussed with  and daughter. Discussed with Dr Apodaca, ID       Subjective:     Chief Complaint:: Patient was  Once    Followed by    ceFEPIme (MAXIPIME) 2,000 mg in sodium chloride 0.9 % 100 mL IVPB (addEASE)  2,000 mg IntraVENous Q12H    cetirizine (ZYRTEC) tablet 5 mg  5 mg Oral Daily    albuterol sulfate HFA (PROVENTIL;VENTOLIN;PROAIR) 108 (90 Base) MCG/ACT inhaler 2 puff  2 puff Inhalation Q4H PRN    sodium chloride 0.9 % bolus 500 mL  500 mL IntraVENous Once    midodrine (PROAMATINE) tablet 5 mg  5 mg Oral TID WC    haloperidol (HALDOL) tablet 2 mg  2 mg Oral Q6H PRN    haloperidol lactate (HALDOL) injection 2 mg  2 mg IntraVENous Q6H PRN    OLANZapine (ZYPREXA) tablet 2.5 mg  2.5 mg Oral Nightly    sodium chloride flush 0.9 % injection 5-40 mL  5-40 mL IntraVENous 2 times per day    sodium chloride flush 0.9 % injection 5-40 mL  5-40 mL IntraVENous PRN    0.9 % sodium chloride infusion   IntraVENous PRN    potassium chloride (KLOR-CON) extended release tablet 40 mEq  40 mEq Oral PRN    Or    potassium bicarb-citric acid (EFFER-K) effervescent tablet 40 mEq  40 mEq Oral PRN    Or    potassium chloride 10 mEq/100 mL IVPB (Peripheral Line)  10 mEq IntraVENous PRN    magnesium sulfate 2000 mg in 50 mL IVPB premix  2,000 mg IntraVENous PRN    enoxaparin (LOVENOX) injection 40 mg  40 mg SubCUTAneous Daily    ondansetron (ZOFRAN-ODT) disintegrating tablet 4 mg  4 mg Oral Q8H PRN    Or    ondansetron (ZOFRAN) injection 4 mg  4 mg IntraVENous Q6H PRN    polyethylene glycol (GLYCOLAX) packet 17 g  17 g Oral Daily PRN    acetaminophen (TYLENOL) tablet 650 mg  650 mg Oral Q6H PRN    Or    acetaminophen (TYLENOL) suppository 650 mg  650 mg Rectal Q6H PRN    donepezil (ARICEPT) tablet 10 mg  10 mg Oral Nightly    metoprolol succinate (TOPROL XL) extended release tablet 50 mg  50 mg Oral Daily    rosuvastatin (CRESTOR) tablet 10 mg  10 mg Oral Nightly    glucose chewable tablet 16 g  4 tablet Oral PRN    dextrose bolus 10% 125 mL  125 mL IntraVENous PRN    Or    dextrose bolus 10% 250 mL  250 mL IntraVENous PRN    glucagon injection

## 2025-03-18 NOTE — CARE COORDINATION
3/18/2025  4:06 PM  Care Management Progress Note    Reason for Admission:   Dehydration [E86.0]  Confusion [R41.0]  Flu-like symptoms [R68.89]  Sepsis (HCC) [A41.9]  Sepsis, due to unspecified organism, unspecified whether acute organ dysfunction present (HCC) [A41.9]         Patient Admission Status: Inpatient  RUR: 17%  Hospitalization in the last 30 days (Readmission):  No        Transition of care plan:  Pt discussed in IDR is continuing to require medical management, pt on 2 L O2 NC, wean as tolerated, oximetry prior to DC if unable to wean and pr returns home.   IR for liver Bx/drainage of liver abscess  ID consult   PT/OT treating recc SNF at DC  SNF   If SNF or IPR:  Date FOC offered:   Accepting facility:   Date authorization started with reference number:   Date authorization received and expires:   Discharge plan communicated with patient and/or discharge caregiver: No    Date 1st IMM letter given: 3/17/25  Outpatient follow-up.  Transport at discharge: TBD pending progress   EFREN Chan

## 2025-03-18 NOTE — PROGRESS NOTES
1120 Notified Haja MALCOLM that patients BP had dropped to 97/43 from 130/60 on morning vitals.  I asked if he wanted bumex held and he said to hold it for now.

## 2025-03-19 ENCOUNTER — APPOINTMENT (OUTPATIENT)
Facility: HOSPITAL | Age: 80
DRG: 871 | End: 2025-03-19
Payer: MEDICARE

## 2025-03-19 LAB
ANION GAP SERPL CALC-SCNC: 5 MMOL/L (ref 2–12)
BASOPHILS # BLD: 0.04 K/UL (ref 0–0.1)
BASOPHILS NFR BLD: 0.2 % (ref 0–1)
BUN SERPL-MCNC: 20 MG/DL (ref 6–20)
BUN/CREAT SERPL: 18 (ref 12–20)
CALCIUM SERPL-MCNC: 7.5 MG/DL (ref 8.5–10.1)
CHLORIDE SERPL-SCNC: 110 MMOL/L (ref 97–108)
CO2 SERPL-SCNC: 25 MMOL/L (ref 21–32)
CREAT SERPL-MCNC: 1.09 MG/DL (ref 0.55–1.02)
DIFFERENTIAL METHOD BLD: ABNORMAL
EOSINOPHIL # BLD: 0.67 K/UL (ref 0–0.4)
EOSINOPHIL NFR BLD: 3.3 % (ref 0–7)
ERYTHROCYTE [DISTWIDTH] IN BLOOD BY AUTOMATED COUNT: 15.7 % (ref 11.5–14.5)
GLUCOSE BLD STRIP.AUTO-MCNC: 108 MG/DL (ref 65–117)
GLUCOSE BLD STRIP.AUTO-MCNC: 112 MG/DL (ref 65–117)
GLUCOSE BLD STRIP.AUTO-MCNC: 115 MG/DL (ref 65–117)
GLUCOSE BLD STRIP.AUTO-MCNC: 156 MG/DL (ref 65–117)
GLUCOSE SERPL-MCNC: 141 MG/DL (ref 65–100)
HCT VFR BLD AUTO: 25.9 % (ref 35–47)
HGB BLD-MCNC: 8.8 G/DL (ref 11.5–16)
IMM GRANULOCYTES # BLD AUTO: 0.47 K/UL (ref 0–0.04)
IMM GRANULOCYTES NFR BLD AUTO: 2.3 % (ref 0–0.5)
LYMPHOCYTES # BLD: 1.59 K/UL (ref 0.8–3.5)
LYMPHOCYTES NFR BLD: 7.8 % (ref 12–49)
MAGNESIUM SERPL-MCNC: 2.4 MG/DL (ref 1.6–2.4)
MCH RBC QN AUTO: 29.1 PG (ref 26–34)
MCHC RBC AUTO-ENTMCNC: 34 G/DL (ref 30–36.5)
MCV RBC AUTO: 85.8 FL (ref 80–99)
MONOCYTES # BLD: 0.59 K/UL (ref 0–1)
MONOCYTES NFR BLD: 2.9 % (ref 5–13)
NEUTS SEG # BLD: 17.03 K/UL (ref 1.8–8)
NEUTS SEG NFR BLD: 83.5 % (ref 32–75)
NRBC # BLD: 0 K/UL (ref 0–0.01)
NRBC BLD-RTO: 0 PER 100 WBC
PHOSPHATE SERPL-MCNC: 1.6 MG/DL (ref 2.6–4.7)
PLATELET # BLD AUTO: 211 K/UL (ref 150–400)
PMV BLD AUTO: 12 FL (ref 8.9–12.9)
POTASSIUM SERPL-SCNC: 2.9 MMOL/L (ref 3.5–5.1)
RBC # BLD AUTO: 3.02 M/UL (ref 3.8–5.2)
RBC MORPH BLD: ABNORMAL
SERVICE CMNT-IMP: ABNORMAL
SERVICE CMNT-IMP: NORMAL
SODIUM SERPL-SCNC: 140 MMOL/L (ref 136–145)
WBC # BLD AUTO: 20.4 K/UL (ref 3.6–11)

## 2025-03-19 PROCEDURE — 77012 CT SCAN FOR NEEDLE BIOPSY: CPT

## 2025-03-19 PROCEDURE — 87205 SMEAR GRAM STAIN: CPT

## 2025-03-19 PROCEDURE — 2500000003 HC RX 250 WO HCPCS: Performed by: INTERNAL MEDICINE

## 2025-03-19 PROCEDURE — 88307 TISSUE EXAM BY PATHOLOGIST: CPT

## 2025-03-19 PROCEDURE — 6370000000 HC RX 637 (ALT 250 FOR IP): Performed by: HOSPITALIST

## 2025-03-19 PROCEDURE — 6370000000 HC RX 637 (ALT 250 FOR IP)

## 2025-03-19 PROCEDURE — 1100000000 HC RM PRIVATE

## 2025-03-19 PROCEDURE — 84100 ASSAY OF PHOSPHORUS: CPT

## 2025-03-19 PROCEDURE — 99156 MOD SED OTH PHYS/QHP 5/>YRS: CPT

## 2025-03-19 PROCEDURE — 6360000002 HC RX W HCPCS: Performed by: INTERNAL MEDICINE

## 2025-03-19 PROCEDURE — 2500000003 HC RX 250 WO HCPCS: Performed by: HOSPITALIST

## 2025-03-19 PROCEDURE — 85025 COMPLETE CBC W/AUTO DIFF WBC: CPT

## 2025-03-19 PROCEDURE — 82962 GLUCOSE BLOOD TEST: CPT

## 2025-03-19 PROCEDURE — 83735 ASSAY OF MAGNESIUM: CPT

## 2025-03-19 PROCEDURE — 2580000003 HC RX 258: Performed by: INTERNAL MEDICINE

## 2025-03-19 PROCEDURE — 6370000000 HC RX 637 (ALT 250 FOR IP): Performed by: INTERNAL MEDICINE

## 2025-03-19 PROCEDURE — 88333 PATH CONSLTJ SURG CYTO XM 1: CPT

## 2025-03-19 PROCEDURE — 6370000000 HC RX 637 (ALT 250 FOR IP): Performed by: RADIOLOGY

## 2025-03-19 PROCEDURE — 05HC33Z INSERTION OF INFUSION DEVICE INTO LEFT BASILIC VEIN, PERCUTANEOUS APPROACH: ICD-10-PCS | Performed by: STUDENT IN AN ORGANIZED HEALTH CARE EDUCATION/TRAINING PROGRAM

## 2025-03-19 PROCEDURE — 87070 CULTURE OTHR SPECIMN AEROBIC: CPT

## 2025-03-19 PROCEDURE — 36415 COLL VENOUS BLD VENIPUNCTURE: CPT

## 2025-03-19 PROCEDURE — 88112 CYTOPATH CELL ENHANCE TECH: CPT

## 2025-03-19 PROCEDURE — 6360000002 HC RX W HCPCS: Performed by: RADIOLOGY

## 2025-03-19 PROCEDURE — 6360000002 HC RX W HCPCS: Performed by: HOSPITALIST

## 2025-03-19 PROCEDURE — 0FB03ZX EXCISION OF LIVER, PERCUTANEOUS APPROACH, DIAGNOSTIC: ICD-10-PCS | Performed by: INTERNAL MEDICINE

## 2025-03-19 PROCEDURE — 2700000000 HC OXYGEN THERAPY PER DAY

## 2025-03-19 PROCEDURE — 80048 BASIC METABOLIC PNL TOTAL CA: CPT

## 2025-03-19 PROCEDURE — 76705 ECHO EXAM OF ABDOMEN: CPT

## 2025-03-19 RX ORDER — FENTANYL CITRATE 50 UG/ML
100 INJECTION, SOLUTION INTRAMUSCULAR; INTRAVENOUS AS NEEDED
Refills: 0 | Status: DISCONTINUED | OUTPATIENT
Start: 2025-03-19 | End: 2025-03-19

## 2025-03-19 RX ORDER — MIDAZOLAM HYDROCHLORIDE 1 MG/ML
5 INJECTION, SOLUTION INTRAMUSCULAR; INTRAVENOUS AS NEEDED
Status: DISCONTINUED | OUTPATIENT
Start: 2025-03-19 | End: 2025-03-19

## 2025-03-19 RX ADMIN — MIDODRINE HYDROCHLORIDE 5 MG: 5 TABLET ORAL at 18:02

## 2025-03-19 RX ADMIN — METOPROLOL SUCCINATE 50 MG: 50 TABLET, FILM COATED, EXTENDED RELEASE ORAL at 08:32

## 2025-03-19 RX ADMIN — CEFEPIME 2000 MG: 2 INJECTION, POWDER, FOR SOLUTION INTRAVENOUS at 20:28

## 2025-03-19 RX ADMIN — FENTANYL CITRATE 25 MCG: 0.05 INJECTION, SOLUTION INTRAMUSCULAR; INTRAVENOUS at 11:15

## 2025-03-19 RX ADMIN — MIDODRINE HYDROCHLORIDE 5 MG: 5 TABLET ORAL at 08:26

## 2025-03-19 RX ADMIN — Medication 1 EACH: at 11:30

## 2025-03-19 RX ADMIN — POTASSIUM CHLORIDE 10 MEQ: 7.45 INJECTION INTRAVENOUS at 08:31

## 2025-03-19 RX ADMIN — MIDODRINE HYDROCHLORIDE 5 MG: 5 TABLET ORAL at 12:40

## 2025-03-19 RX ADMIN — BENZONATATE 100 MG: 100 CAPSULE ORAL at 12:48

## 2025-03-19 RX ADMIN — CETIRIZINE HYDROCHLORIDE 5 MG: 10 TABLET, FILM COATED ORAL at 08:32

## 2025-03-19 RX ADMIN — POTASSIUM CHLORIDE 10 MEQ: 7.45 INJECTION INTRAVENOUS at 13:34

## 2025-03-19 RX ADMIN — FENTANYL CITRATE 25 MCG: 0.05 INJECTION, SOLUTION INTRAMUSCULAR; INTRAVENOUS at 11:18

## 2025-03-19 RX ADMIN — VANCOMYCIN HYDROCHLORIDE 1000 MG: 1 INJECTION, POWDER, LYOPHILIZED, FOR SOLUTION INTRAVENOUS at 18:02

## 2025-03-19 RX ADMIN — METRONIDAZOLE 500 MG: 500 INJECTION, SOLUTION INTRAVENOUS at 20:27

## 2025-03-19 RX ADMIN — CEFEPIME 2000 MG: 2 INJECTION, POWDER, FOR SOLUTION INTRAVENOUS at 08:23

## 2025-03-19 RX ADMIN — POTASSIUM CHLORIDE 10 MEQ: 7.45 INJECTION INTRAVENOUS at 12:35

## 2025-03-19 RX ADMIN — METRONIDAZOLE 500 MG: 500 INJECTION, SOLUTION INTRAVENOUS at 12:39

## 2025-03-19 RX ADMIN — POTASSIUM PHOSPHATE, MONOBASIC POTASSIUM PHOSPHATE, DIBASIC 30 MMOL: 224; 236 INJECTION, SOLUTION, CONCENTRATE INTRAVENOUS at 15:38

## 2025-03-19 RX ADMIN — BENZONATATE 100 MG: 100 CAPSULE ORAL at 01:12

## 2025-03-19 RX ADMIN — METRONIDAZOLE 500 MG: 500 INJECTION, SOLUTION INTRAVENOUS at 00:36

## 2025-03-19 RX ADMIN — ROSUVASTATIN CALCIUM 10 MG: 10 TABLET, FILM COATED ORAL at 21:36

## 2025-03-19 RX ADMIN — DONEPEZIL HYDROCHLORIDE 10 MG: 5 TABLET ORAL at 21:36

## 2025-03-19 RX ADMIN — POTASSIUM CHLORIDE 10 MEQ: 7.45 INJECTION INTRAVENOUS at 14:33

## 2025-03-19 RX ADMIN — SODIUM CHLORIDE, PRESERVATIVE FREE 10 ML: 5 INJECTION INTRAVENOUS at 21:36

## 2025-03-19 NOTE — PROGRESS NOTES
INTERVENTIONAL RADIOLOGY  Preoperative History and Physical      Patient:  Rusty Nevarez  :  1945  Age:  79 y.o.  MRN:  [id number]  Today's Date:  3/19/2025      CC / HPI   Rusty Nevarez is a 79 y.o. female with a history of necrotic hepatic mass vs abscess who presents for biopsy and possible drainage.    PAST MEDICAL HISTORY  Past Medical History:   Diagnosis Date    Atrial fibrillation (HCC)     CKD (chronic kidney disease)     Hydrocephalus (HCC)     Hyperlipidemia     Hypertension     Mild dementia (HCC)     Prediabetes     Subarachnoid bleed (HCC)     due to aneurysm     PAST SURGICAL HISTORY  Past Surgical History:   Procedure Laterality Date    BRAIN ANEURYSM SURGERY      VENTRICULOPERITONEAL SHUNT       SOCIAL HISTORY  Social History     Socioeconomic History    Marital status:      Spouse name: Not on file    Number of children: Not on file    Years of education: Not on file    Highest education level: Not on file   Occupational History    Not on file   Tobacco Use    Smoking status: Never    Smokeless tobacco: Never   Vaping Use    Vaping status: Never Used   Substance and Sexual Activity    Alcohol use: Yes     Comment: 1 drink a week    Drug use: Never    Sexual activity: Not on file   Other Topics Concern    Not on file   Social History Narrative    Not on file     Social Drivers of Health     Financial Resource Strain: Not on file   Food Insecurity: No Food Insecurity (3/15/2025)    Hunger Vital Sign     Worried About Running Out of Food in the Last Year: Never true     Ran Out of Food in the Last Year: Never true   Transportation Needs: No Transportation Needs (3/15/2025)    PRAPARE - Transportation     Lack of Transportation (Medical): No     Lack of Transportation (Non-Medical): No   Physical Activity: Not on file   Stress: Not on file   Social Connections: Not on file   Intimate Partner Violence: Not on file   Housing Stability: Low Risk  (3/15/2025)    Housing Stability

## 2025-03-19 NOTE — PROGRESS NOTES
Patient to radiology holding from room A376    Accompanying  the patient is spouse for consent.    ID verified using two patient identifiers, as well as NPO status, patient is alert and oriented x 2.    Lungs sounds auscultated, clear bilaterally.  Patient is normal sinus rhythm on the cardiac monitor.  Patient is afebrile, and pain is 0 out of 10 on the numeric scale.        Extended dwell powerglide pro IV placed in the left basilic is patent and has positive blood return.  Labs sent to include n/a    Jose Pierson NP called to bedside to consent the patient at 1315, opportunity for questions provided.  Sedation plans discussed with provider.      Transported patient to US department at 1030 to prepare for US guided liver biopsy vs US guided abscess drainage/drain placement with conscious sedation    Staff members present:  Heladio US tech  Denae Cytotechnologist  Michael Pierson NP    At 1100 we moved to CT for better visualization of the target.    Time out performed at 1114    Procedure start time 1114    Patient monitored throughout the procedure, to include cardiac, blood pressure, O2 saturation.  Vitals remained stable throughout the procedure.    Patient received a total of 50 mcg fentanyl intra-procedure    Specimen was noted by pathologist to be infectious, and was sent to the lab for culture and gram stain.  Dressing applied to right upper abdomen by Jose , dressing is clean, dry, and intact.    Procedure end time 1139    Patient tolerated procedure well    Returned to radiology holding at 1145    Family member updated with plan of care.    Report to Yesenia VILLARREAL, transported to room A376    Viola Rodriguez RN

## 2025-03-19 NOTE — CONSULTS
Infectious Disease Consult    Impression/Plan   Streptococcus intermedius bacteremia complicated by hepatic abscess.  Repeat blood cultures are sterile to date.  TTE negative for vegetation.  This organism is associated with endocarditis however CHANDNI not necessary as patient will receive an extended course of antibiotics for the hepatic abscess.  Narrow antibiotics to Unasyn.  Hepatic abscess.  Likely due to above.  Status post aspiration by IR 3/19.  Cultures pending.  Will await culture results to make discharge antibiotic recommendations  Fever and leukocytosis.  Due to above.  WBC slowly trending down.  E. coli and Enterococcus faecium isolated from urine.  UA bland.  Significance unclear.  This will be covered by current antibiotics  CKD 3.  Monitor closely on antibiotics  History of cefazolin and erythromycin allergies.  Tolerating cefepime  Hydrocephalus.  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    Anti-infectives:   Vancomycin  Cefepime  Metronidazole    Subjective:   Date of Consultation:  March 19, 2025  Date of Admission: 3/15/2025   Referring Physician:     Patient is a 79 y.o. female with a past medical history significant for atrial fibrillation, subarachnoid hemorrhage due to aneurysm,  shunt, and CKD 3 who is being seen for bacteremia.    Patient was admitted to [facility] on 3/15/2025 with complaints of a 30 to 40-minute episode of rigors associated with vomiting and diarrhea.  Patient had a similar episode the following night and another episode on the day of admission.    Workup at the time of admission revealed a white count of 16,000.  Blood cultures have subsequently returned growing Streptococcus intermedius and she has been found to have a hepatic abscess on CT.  She is currently on vancomycin, cefepime, and metronidazole.  The infectious diseases service has been asked to assist with antibiotic management        Patient Active Problem List

## 2025-03-19 NOTE — PLAN OF CARE
Problem: Chronic Conditions and Co-morbidities  Goal: Patient's chronic conditions and co-morbidity symptoms are monitored and maintained or improved  Outcome: Progressing  Flowsheets (Taken 3/18/2025 1952)  Care Plan - Patient's Chronic Conditions and Co-Morbidity Symptoms are Monitored and Maintained or Improved:   Monitor and assess patient's chronic conditions and comorbid symptoms for stability, deterioration, or improvement   Collaborate with multidisciplinary team to address chronic and comorbid conditions and prevent exacerbation or deterioration   Update acute care plan with appropriate goals if chronic or comorbid symptoms are exacerbated and prevent overall improvement and discharge     Problem: Safety - Adult  Goal: Free from fall injury  Outcome: Progressing     Problem: Respiratory - Adult  Goal: Achieves optimal ventilation and oxygenation  Outcome: Progressing  Flowsheets (Taken 3/18/2025 1952)  Achieves optimal ventilation and oxygenation: Assess for changes in respiratory status     Problem: Discharge Planning  Goal: Discharge to home or other facility with appropriate resources  Outcome: Progressing  Flowsheets (Taken 3/18/2025 1952)  Discharge to home or other facility with appropriate resources:   Identify barriers to discharge with patient and caregiver   Arrange for needed discharge resources and transportation as appropriate   Identify discharge learning needs (meds, wound care, etc)   Refer to discharge planning if patient needs post-hospital services based on physician order or complex needs related to functional status, cognitive ability or social support system     Problem: Skin/Tissue Integrity  Goal: Skin integrity remains intact  Description: 1.  Monitor for areas of redness and/or skin breakdown  2.  Assess vascular access sites hourly  3.  Every 4-6 hours minimum:  Change oxygen saturation probe site  4.  Every 4-6 hours:  If on nasal continuous positive airway pressure,  respiratory therapy assess nares and determine need for appliance change or resting period  Outcome: Progressing  Flowsheets (Taken 3/18/2025 1952)  Skin Integrity Remains Intact:   Monitor for areas of redness and/or skin breakdown   Assess vascular access sites hourly   Every 4-6 hours minimum: Change oxygen saturation probe site     Problem: Physical Therapy - Adult  Goal: By Discharge: Performs mobility at highest level of function for planned discharge setting.  See evaluation for individualized goals.  Description: FUNCTIONAL STATUS PRIOR TO ADMISSION: Patient was independent and active without use of DME.    HOME SUPPORT PRIOR TO ADMISSION: The patient lived with  has supportive daughter involved as well.    Physical Therapy Goals  Initiated 3/17/2025  1.  Patient will move from supine to sit and sit to supine in bed with modified independence within 7 day(s).    2.  Patient will perform sit to stand with modified independence within 7 day(s).  3.  Patient will transfer from bed to chair and chair to bed with modified independence using the least restrictive device within 7 day(s).  4.  Patient will ambulate with supervision/set-up for 200 feet with the least restrictive device within 7 day(s).   5.  Patient will ascend/descend 4 stairs with 1 handrail(s) with contact guard assist within 7 day(s).  3/18/2025 1520 by Vlad Browne, PT  Outcome: Progressing     Problem: Occupational Therapy - Adult  Goal: By Discharge: Performs self-care activities at highest level of function for planned discharge setting.  See evaluation for individualized goals.  Description: FUNCTIONAL STATUS PRIOR TO ADMISSION:  Patient is normally independent with ADLs and mobility without use of AD/DME.  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  No home O2 use.     HOME SUPPORT: Patient lived with her . Has a very supportive daughter who lives locally as

## 2025-03-19 NOTE — PROGRESS NOTES
[facility]  [address] [place], [place] [id number] [phone]      Hospitalist  Progress Note      NAME:       Rusty Nevarez   :        1945  MRM:        [id number]    Date of service: 3/19/2025      Subjective: Patient seen and examined by me. Patient admitted with sepsis due to a UTI and bacteremia. She also has a liver lesion on CT and had a CT guided biopsy. No new symptoms other than pain. Mouth breathes.      Objective:    Vital Signs:    /72   Pulse 79   Temp 98.1 °F (36.7 °C) (Oral)   Resp 17   Ht 1.651 m (5' 5\")   Wt 59 kg (130 lb)   SpO2 90%   BMI 21.63 kg/m²      No intake or output data in the 24 hours ending 25 1413     Current inpatient medications reviewed:  Current Facility-Administered Medications   Medication Dose Route Frequency    [START ON 3/20/2025] Vanc random  0600   Other Once    HYDROmorphone (DILAUDID) injection 0.25 mg  0.25 mg IntraVENous Q4H PRN    benzonatate (TESSALON) capsule 100 mg  100 mg Oral TID PRN    vancomycin (VANCOCIN) 1,000 mg in sodium chloride 0.9 % 250 mL IVPB ([id number])  1,000 mg IntraVENous Q24H    metroNIDAZOLE (FLAGYL) 500 mg in 0.9% NaCl 100 mL IVPB premix  500 mg IntraVENous Q8H    ceFEPIme (MAXIPIME) 2,000 mg in sodium chloride 0.9 % 100 mL IVPB (addEASE)  2,000 mg IntraVENous Q12H    cetirizine (ZYRTEC) tablet 5 mg  5 mg Oral Daily    albuterol sulfate HFA (PROVENTIL;VENTOLIN;PROAIR) 108 (90 Base) MCG/ACT inhaler 2 puff  2 puff Inhalation Q4H PRN    sodium chloride 0.9 % bolus 500 mL  500 mL IntraVENous Once    midodrine (PROAMATINE) tablet 5 mg  5 mg Oral TID WC    haloperidol (HALDOL) tablet 2 mg  2 mg Oral Q6H PRN    haloperidol lactate (HALDOL) injection 2 mg  2 mg IntraVENous Q6H PRN    [Held by provider] OLANZapine (ZYPREXA) tablet 2.5 mg  2.5 mg Oral Nightly    sodium chloride flush 0.9 % injection 5-40 mL  5-40 mL

## 2025-03-19 NOTE — PROGRESS NOTES
ID.  Attempted to see patient twice but she was off the floor both times.  Will check back tomorrow

## 2025-03-20 PROBLEM — Z88.1 HX OF ANTIBIOTIC ALLERGY: Status: ACTIVE | Noted: 2025-03-20

## 2025-03-20 PROBLEM — K75.0 LIVER ABSCESS: Status: ACTIVE | Noted: 2025-03-20

## 2025-03-20 LAB
ANION GAP SERPL CALC-SCNC: 8 MMOL/L (ref 2–12)
BACTERIA SPEC CULT: ABNORMAL
BACTERIA SPEC CULT: ABNORMAL
BASOPHILS # BLD: 0 K/UL (ref 0–0.1)
BASOPHILS NFR BLD: 0 % (ref 0–1)
BUN SERPL-MCNC: 19 MG/DL (ref 6–20)
BUN/CREAT SERPL: 21 (ref 12–20)
CALCIUM SERPL-MCNC: 6.9 MG/DL (ref 8.5–10.1)
CC UR VC: ABNORMAL
CHLORIDE SERPL-SCNC: 115 MMOL/L (ref 97–108)
CO2 SERPL-SCNC: 22 MMOL/L (ref 21–32)
CREAT SERPL-MCNC: 0.9 MG/DL (ref 0.55–1.02)
CYTOLOGY-NON GYN: NORMAL
DIFFERENTIAL METHOD BLD: ABNORMAL
EOSINOPHIL # BLD: 0.76 K/UL (ref 0–0.4)
EOSINOPHIL NFR BLD: 4 % (ref 0–7)
ERYTHROCYTE [DISTWIDTH] IN BLOOD BY AUTOMATED COUNT: 15.9 % (ref 11.5–14.5)
GLUCOSE BLD STRIP.AUTO-MCNC: 116 MG/DL (ref 65–117)
GLUCOSE BLD STRIP.AUTO-MCNC: 125 MG/DL (ref 65–117)
GLUCOSE BLD STRIP.AUTO-MCNC: 144 MG/DL (ref 65–117)
GLUCOSE BLD STRIP.AUTO-MCNC: 151 MG/DL (ref 65–117)
GLUCOSE SERPL-MCNC: 97 MG/DL (ref 65–100)
HCT VFR BLD AUTO: 25.6 % (ref 35–47)
HGB BLD-MCNC: 8.6 G/DL (ref 11.5–16)
IMM GRANULOCYTES # BLD AUTO: 0 K/UL
IMM GRANULOCYTES NFR BLD AUTO: 0 %
LYMPHOCYTES # BLD: 0.38 K/UL (ref 0.8–3.5)
LYMPHOCYTES NFR BLD: 2 % (ref 12–49)
MCH RBC QN AUTO: 29.3 PG (ref 26–34)
MCHC RBC AUTO-ENTMCNC: 33.6 G/DL (ref 30–36.5)
MCV RBC AUTO: 87.1 FL (ref 80–99)
MONOCYTES # BLD: 0 K/UL (ref 0–1)
MONOCYTES NFR BLD: 0 % (ref 5–13)
MYELOCYTES NFR BLD MANUAL: 2 %
NEUTS BAND NFR BLD MANUAL: 2 % (ref 0–6)
NEUTS SEG # BLD: 17.57 K/UL (ref 1.8–8)
NEUTS SEG NFR BLD: 90 % (ref 32–75)
NRBC # BLD: 0 K/UL (ref 0–0.01)
NRBC BLD-RTO: 0 PER 100 WBC
PLATELET # BLD AUTO: 214 K/UL (ref 150–400)
PMV BLD AUTO: 11.7 FL (ref 8.9–12.9)
POTASSIUM SERPL-SCNC: 3.7 MMOL/L (ref 3.5–5.1)
RBC # BLD AUTO: 2.94 M/UL (ref 3.8–5.2)
RBC MORPH BLD: ABNORMAL
SERVICE CMNT-IMP: ABNORMAL
SERVICE CMNT-IMP: NORMAL
SODIUM SERPL-SCNC: 145 MMOL/L (ref 136–145)
VANCOMYCIN SERPL-MCNC: 15.5 UG/ML
WBC # BLD AUTO: 19.1 K/UL (ref 3.6–11)

## 2025-03-20 PROCEDURE — 80048 BASIC METABOLIC PNL TOTAL CA: CPT

## 2025-03-20 PROCEDURE — 6370000000 HC RX 637 (ALT 250 FOR IP): Performed by: INTERNAL MEDICINE

## 2025-03-20 PROCEDURE — 97530 THERAPEUTIC ACTIVITIES: CPT

## 2025-03-20 PROCEDURE — 94761 N-INVAS EAR/PLS OXIMETRY MLT: CPT

## 2025-03-20 PROCEDURE — 51798 US URINE CAPACITY MEASURE: CPT

## 2025-03-20 PROCEDURE — 6360000002 HC RX W HCPCS: Performed by: INTERNAL MEDICINE

## 2025-03-20 PROCEDURE — 2580000003 HC RX 258: Performed by: INTERNAL MEDICINE

## 2025-03-20 PROCEDURE — 97116 GAIT TRAINING THERAPY: CPT

## 2025-03-20 PROCEDURE — 80202 ASSAY OF VANCOMYCIN: CPT

## 2025-03-20 PROCEDURE — 1100000000 HC RM PRIVATE

## 2025-03-20 PROCEDURE — 85025 COMPLETE CBC W/AUTO DIFF WBC: CPT

## 2025-03-20 PROCEDURE — 99223 1ST HOSP IP/OBS HIGH 75: CPT | Performed by: INTERNAL MEDICINE

## 2025-03-20 PROCEDURE — 2500000003 HC RX 250 WO HCPCS: Performed by: HOSPITALIST

## 2025-03-20 PROCEDURE — 6370000000 HC RX 637 (ALT 250 FOR IP): Performed by: HOSPITALIST

## 2025-03-20 PROCEDURE — 6370000000 HC RX 637 (ALT 250 FOR IP)

## 2025-03-20 PROCEDURE — 2700000000 HC OXYGEN THERAPY PER DAY

## 2025-03-20 PROCEDURE — 82962 GLUCOSE BLOOD TEST: CPT

## 2025-03-20 RX ADMIN — MIDODRINE HYDROCHLORIDE 5 MG: 5 TABLET ORAL at 17:44

## 2025-03-20 RX ADMIN — SODIUM CHLORIDE, PRESERVATIVE FREE 10 ML: 5 INJECTION INTRAVENOUS at 20:21

## 2025-03-20 RX ADMIN — ROSUVASTATIN CALCIUM 10 MG: 10 TABLET, FILM COATED ORAL at 20:21

## 2025-03-20 RX ADMIN — MIDODRINE HYDROCHLORIDE 5 MG: 5 TABLET ORAL at 12:58

## 2025-03-20 RX ADMIN — CEFEPIME 2000 MG: 2 INJECTION, POWDER, FOR SOLUTION INTRAVENOUS at 09:46

## 2025-03-20 RX ADMIN — CETIRIZINE HYDROCHLORIDE 5 MG: 10 TABLET, FILM COATED ORAL at 09:38

## 2025-03-20 RX ADMIN — METRONIDAZOLE 500 MG: 500 INJECTION, SOLUTION INTRAVENOUS at 13:05

## 2025-03-20 RX ADMIN — METOPROLOL SUCCINATE 50 MG: 50 TABLET, FILM COATED, EXTENDED RELEASE ORAL at 09:40

## 2025-03-20 RX ADMIN — DONEPEZIL HYDROCHLORIDE 10 MG: 5 TABLET ORAL at 20:21

## 2025-03-20 RX ADMIN — BENZONATATE 100 MG: 100 CAPSULE ORAL at 09:38

## 2025-03-20 RX ADMIN — METRONIDAZOLE 500 MG: 500 INJECTION, SOLUTION INTRAVENOUS at 04:09

## 2025-03-20 RX ADMIN — SODIUM CHLORIDE 3000 MG: 9 INJECTION, SOLUTION INTRAVENOUS at 17:48

## 2025-03-20 RX ADMIN — SODIUM CHLORIDE, PRESERVATIVE FREE 10 ML: 5 INJECTION INTRAVENOUS at 09:47

## 2025-03-20 RX ADMIN — MIDODRINE HYDROCHLORIDE 5 MG: 5 TABLET ORAL at 09:38

## 2025-03-20 NOTE — PLAN OF CARE
Problem: Physical Therapy - Adult  Goal: By Discharge: Performs mobility at highest level of function for planned discharge setting.  See evaluation for individualized goals.  Description: FUNCTIONAL STATUS PRIOR TO ADMISSION: Patient was independent and active without use of DME.    HOME SUPPORT PRIOR TO ADMISSION: The patient lived with  has supportive daughter involved as well.    Physical Therapy Goals  Initiated 3/17/2025  1.  Patient will move from supine to sit and sit to supine in bed with modified independence within 7 day(s).    2.  Patient will perform sit to stand with modified independence within 7 day(s).  3.  Patient will transfer from bed to chair and chair to bed with modified independence using the least restrictive device within 7 day(s).  4.  Patient will ambulate with supervision/set-up for 200 feet with the least restrictive device within 7 day(s).   5.  Patient will ascend/descend 4 stairs with 1 handrail(s) with contact guard assist within 7 day(s).  Outcome: Progressing   PHYSICAL THERAPY TREATMENT    Patient: Rusty Nevarez (79 y.o. female)  Date: 3/20/2025  Diagnosis: Dehydration [E86.0]  Confusion [R41.0]  Flu-like symptoms [R68.89]  Sepsis (HCC) [A41.9]  Sepsis, due to unspecified organism, unspecified whether acute organ dysfunction present (HCC) [A41.9] Sepsis (HCC)      Precautions: Restrictions/Precautions  Restrictions/Precautions: Fall Risk, Bed Alarm            ASSESSMENT:  Patient continues to benefit from skilled PT services and is progressing towards goals. She is received supine in bed on 5L/min O2 demonstrates O2 sats 96%. Patient ambulates in and out of the restroom and around the unit with RW and HHA. She demonstrates improved mobility provided HHA due unfamiliarity of RW. Patient ambulates with increased trunk sway and minor path deviations. No overt LOB is noted. She is able to carry on a conversation during ambulation and does not demonstrate breathlessness.

## 2025-03-20 NOTE — PROGRESS NOTES
OT services deferred in AM- patient working with another discipline, and had just received lunch. Followed up in PM- patient was sleeping soundly with spouse at bedside.   OT POC unchanged  Rosemary Aponte OTR/L

## 2025-03-20 NOTE — PROGRESS NOTES
Physician Progress Note      PATIENT:               GER DONALD  [facility] #:                  [id number]  :                       1945  ADMIT DATE:       3/15/2025 5:38 PM  DISCH DATE:  RESPONDING  PROVIDER #:        Magdy Baig MD          QUERY TEXT:    Patient admitted with sepsis. Documentation reflects pneumonia in H&P.  If   possible, please document in the progress notes and discharge summary if   pneumonia was:    The medical record reflects the following:  Risk Factors: 79 y.o.  female with PMHx atrial fibrillation, not on   anticoagulation, subarachnoid hemorrhage due to aneurysm status post clipping   , hydrocephalus status post  shunt, prediabetes on metformin, cognitive   impairment on donepezil, CKD 3, hyperlipidemia, hypertension  Clinical Indicators: Per H&P - Suspect RLL CAP  3/16 Med - Cough, fever, chills, hypoxia.  Tmax 103.1.  Negative RVP.    Follow-up blood culture. Continue antibiotics empirically.  3/15 Chest xray - Trace bilateral pleural effusions.  3/16 CT chest - Trace bilateral effusions with overlying atelectasis. No   pulmonary embolus or other acute cardiopulmonary process. Incompletely   visualized  hypodense hepatic lesion which could reflect malignancy.  Treatment: rocephin, azithrmycin    Thank you,  Anabela Lubin RN, CDI  [email or web address]  >  Options provided:  -- Pneumonia confirmed after study  -- Pneumonia ruled out after study  -- Other - I will add my own diagnosis  -- Disagree - Not applicable / Not valid  -- Disagree - Clinically unable to determine / Unknown  -- Refer to Clinical Documentation Reviewer    PROVIDER RESPONSE TEXT:    Pneumonia ruled out after study.    Query created by: Anabela Lubin on 3/20/2025 8:28 AM      QUERY TEXT:    Patient admitted with sepsis. Documentation reflects Acute hypoxic respiratory   failure due to CAP in 3/16 significant event note, however, it is not on   subsequent progress notes.  If possible, please

## 2025-03-20 NOTE — PROGRESS NOTES
[facility] [place] [facility]  [address] [place] [address]  [phone]      Hospitalist  Progress Note      NAME:       Rusty Nevarez   :        1945  MRM:        [id number]    Date of service: 3/20/2025      Subjective: Patient seen and examined by me. Patient admitted with sepsis due to a UTI and bacteremia. She also has a liver lesion on CT and had a CT guided biopsy 3/4. She says she has no pain today. Afebrile. Discussed with her spouse at bedside.       Objective:    Vital Signs:    BP (!) 135/53   Pulse 72   Temp 98.1 °F (36.7 °C) (Oral)   Resp 17   Ht 1.651 m (5' 5\")   Wt 59 kg (130 lb)   SpO2 93%   BMI 21.63 kg/m²        Intake/Output Summary (Last 24 hours) at 3/20/2025 1123  Last data filed at 3/19/2025 2300  Gross per 24 hour   Intake 647.88 ml   Output 250 ml   Net 397.88 ml        Current inpatient medications reviewed:  Current Facility-Administered Medications   Medication Dose Route Frequency    Vanc random  0600   Other Once    HYDROmorphone (DILAUDID) injection 0.25 mg  0.25 mg IntraVENous Q4H PRN    benzonatate (TESSALON) capsule 100 mg  100 mg Oral TID PRN    vancomycin (VANCOCIN) 1,000 mg in sodium chloride 0.9 % 250 mL IVPB ([id number])  1,000 mg IntraVENous Q24H    metroNIDAZOLE (FLAGYL) 500 mg in 0.9% NaCl 100 mL IVPB premix  500 mg IntraVENous Q8H    ceFEPIme (MAXIPIME) 2,000 mg in sodium chloride 0.9 % 100 mL IVPB (addEASE)  2,000 mg IntraVENous Q12H    cetirizine (ZYRTEC) tablet 5 mg  5 mg Oral Daily    albuterol sulfate HFA (PROVENTIL;VENTOLIN;PROAIR) 108 (90 Base) MCG/ACT inhaler 2 puff  2 puff Inhalation Q4H PRN    sodium chloride 0.9 % bolus 500 mL  500 mL IntraVENous Once    midodrine (PROAMATINE) tablet 5 mg  5 mg Oral TID WC    haloperidol (HALDOL) tablet 2 mg  2 mg Oral Q6H PRN    haloperidol lactate (HALDOL) injection 2 mg  2 mg IntraVENous Q6H PRN    [Held by provider]  Electronically signed by Jimmy Mckeon    XR CHEST (2 VW)  Result Date: 3/15/2025  Trace bilateral pleural effusions. Electronically signed by Pepito Nixon    Cultures, Imaging studies reviewed and reports noted, Telemetry reviewed and independently interpreted by me and available notes from other care providers - all reviewed by me on: 3/20/2025    Assessment and Plan:    Sepsis due to UTI, suspected liver abscess POA: urine cultures isolated E coli and Enterococcus faecium. CT abdomen as noted above. Blood cultures as noted below. Continue IV Cefepime, Metronidazole and IV Vancomycin. ID following     Bacteremia due to Streptococcus POA: Blood cultures isolating Streptococcus intermedius. Has a suspected hepatic abscess. Repeat blood cultures neg thus far. Continue IV Vancomycin.     Liver lesion POA: she had a CT guided biopsy 3/19. Cultures remain neg thus far and cytology is pending. Continue IV antibiotics as above    PAF (paroxysmal atrial fibrillation) / Hx of subarachnoid hemorrhage POA: rate controlled. Continue Metoprolol. No anticoagulation    HTN (hypertension), benign / Hypercholesterolemia POA: BP well controlled. Continue Metoprolol, Crestor    Mild dementia POA: no home medications noted. Supportive care    Stage 3a chronic kidney disease POA: renal function stable. Monitor    Care Plan discussed with: Family, Nursing, CM     Prophylaxis:  SCD's                Expected Disposition:  Home w/Family    PCP:      José Manuel Coulter,      I have personally examined and treated the patient at bedside during this period. To assist coordination of care and communication with nursing and staff, this note may be preliminary early in the day, but finalized by end of the day.         ___________________________________________________    Attending Physician:   Magdy Baig MD

## 2025-03-20 NOTE — CARE COORDINATION
Care Management Progress Note    Reason for Admission:   Dehydration [E86.0]  Confusion [R41.0]  Flu-like symptoms [R68.89]  Sepsis (HCC) [A41.9]  Sepsis, due to unspecified organism, unspecified whether acute organ dysfunction present (HCC) [A41.9]         Patient Admission Status: Inpatient  RUR: 18%  Hospitalization in the last 30 days (Readmission):  No        Transition of care plan:  Ongoing medical management.  Bacteremia.  ID following.  Pending cultures.     Discharge Plan:    Home with [facility] services.  CM to discuss [facility] agency choices with patient tomorrow.     Discharge plan communicated with patient and/or discharge caregiver: No.  Will discuss with patient tomorrow.      Date last IMM letter given: 3/17/2025    Outpatient follow-up:  TBD    Transport at discharge: Family    ______________________________________  AMMY Rodriguez, RN-CM  [facility]- Care Management  Available via [employer]  3/20/2025  4:11 PM

## 2025-03-21 LAB
ANION GAP SERPL CALC-SCNC: 7 MMOL/L (ref 2–12)
BASOPHILS # BLD: 0 K/UL (ref 0–0.1)
BASOPHILS NFR BLD: 0 % (ref 0–1)
BUN SERPL-MCNC: 21 MG/DL (ref 6–20)
BUN/CREAT SERPL: 24 (ref 12–20)
CALCIUM SERPL-MCNC: 7.3 MG/DL (ref 8.5–10.1)
CHLORIDE SERPL-SCNC: 115 MMOL/L (ref 97–108)
CO2 SERPL-SCNC: 23 MMOL/L (ref 21–32)
CREAT SERPL-MCNC: 0.86 MG/DL (ref 0.55–1.02)
DIFFERENTIAL METHOD BLD: ABNORMAL
EOSINOPHIL # BLD: 0.37 K/UL (ref 0–0.4)
EOSINOPHIL NFR BLD: 2 % (ref 0–7)
ERYTHROCYTE [DISTWIDTH] IN BLOOD BY AUTOMATED COUNT: 15.9 % (ref 11.5–14.5)
GLUCOSE BLD STRIP.AUTO-MCNC: 122 MG/DL (ref 65–117)
GLUCOSE BLD STRIP.AUTO-MCNC: 123 MG/DL (ref 65–117)
GLUCOSE BLD STRIP.AUTO-MCNC: 149 MG/DL (ref 65–117)
GLUCOSE BLD STRIP.AUTO-MCNC: 160 MG/DL (ref 65–117)
GLUCOSE SERPL-MCNC: 113 MG/DL (ref 65–100)
HCT VFR BLD AUTO: 24.1 % (ref 35–47)
HGB BLD-MCNC: 8.2 G/DL (ref 11.5–16)
IMM GRANULOCYTES # BLD AUTO: 0 K/UL
IMM GRANULOCYTES NFR BLD AUTO: 0 %
LYMPHOCYTES # BLD: 3.37 K/UL (ref 0.8–3.5)
LYMPHOCYTES NFR BLD: 18 % (ref 12–49)
MCH RBC QN AUTO: 29 PG (ref 26–34)
MCHC RBC AUTO-ENTMCNC: 34 G/DL (ref 30–36.5)
MCV RBC AUTO: 85.2 FL (ref 80–99)
METAMYELOCYTES NFR BLD MANUAL: 2 %
MONOCYTES # BLD: 0.19 K/UL (ref 0–1)
MONOCYTES NFR BLD: 1 % (ref 5–13)
MYELOCYTES NFR BLD MANUAL: 1 %
NEUTS BAND NFR BLD MANUAL: 2 % (ref 0–6)
NEUTS SEG # BLD: 14.21 K/UL (ref 1.8–8)
NEUTS SEG NFR BLD: 74 % (ref 32–75)
NRBC # BLD: 0 K/UL (ref 0–0.01)
NRBC BLD-RTO: 0 PER 100 WBC
PLATELET # BLD AUTO: 247 K/UL (ref 150–400)
PMV BLD AUTO: 11.5 FL (ref 8.9–12.9)
POTASSIUM SERPL-SCNC: 3.4 MMOL/L (ref 3.5–5.1)
RBC # BLD AUTO: 2.83 M/UL (ref 3.8–5.2)
RBC MORPH BLD: ABNORMAL
SERVICE CMNT-IMP: ABNORMAL
SODIUM SERPL-SCNC: 145 MMOL/L (ref 136–145)
WBC # BLD AUTO: 18.7 K/UL (ref 3.6–11)

## 2025-03-21 PROCEDURE — 2580000003 HC RX 258: Performed by: INTERNAL MEDICINE

## 2025-03-21 PROCEDURE — 6370000000 HC RX 637 (ALT 250 FOR IP): Performed by: INTERNAL MEDICINE

## 2025-03-21 PROCEDURE — 6360000002 HC RX W HCPCS: Performed by: INTERNAL MEDICINE

## 2025-03-21 PROCEDURE — 2580000003 HC RX 258: Performed by: HOSPITALIST

## 2025-03-21 PROCEDURE — 36415 COLL VENOUS BLD VENIPUNCTURE: CPT

## 2025-03-21 PROCEDURE — 82962 GLUCOSE BLOOD TEST: CPT

## 2025-03-21 PROCEDURE — 97110 THERAPEUTIC EXERCISES: CPT

## 2025-03-21 PROCEDURE — 6370000000 HC RX 637 (ALT 250 FOR IP): Performed by: HOSPITALIST

## 2025-03-21 PROCEDURE — 97116 GAIT TRAINING THERAPY: CPT

## 2025-03-21 PROCEDURE — 85025 COMPLETE CBC W/AUTO DIFF WBC: CPT

## 2025-03-21 PROCEDURE — 2500000003 HC RX 250 WO HCPCS: Performed by: INTERNAL MEDICINE

## 2025-03-21 PROCEDURE — 99232 SBSQ HOSP IP/OBS MODERATE 35: CPT | Performed by: INTERNAL MEDICINE

## 2025-03-21 PROCEDURE — 94761 N-INVAS EAR/PLS OXIMETRY MLT: CPT

## 2025-03-21 PROCEDURE — 80048 BASIC METABOLIC PNL TOTAL CA: CPT

## 2025-03-21 PROCEDURE — 2700000000 HC OXYGEN THERAPY PER DAY

## 2025-03-21 PROCEDURE — 2500000003 HC RX 250 WO HCPCS: Performed by: HOSPITALIST

## 2025-03-21 PROCEDURE — 1100000000 HC RM PRIVATE

## 2025-03-21 RX ORDER — LACTOBACILLUS RHAMNOSUS GG 10B CELL
1 CAPSULE ORAL
Status: DISCONTINUED | OUTPATIENT
Start: 2025-03-21 | End: 2025-03-28 | Stop reason: HOSPADM

## 2025-03-21 RX ORDER — DIPHENHYDRAMINE HCL 25 MG
25 CAPSULE ORAL EVERY 6 HOURS PRN
Status: COMPLETED | OUTPATIENT
Start: 2025-03-21 | End: 2025-03-23

## 2025-03-21 RX ADMIN — MIDODRINE HYDROCHLORIDE 5 MG: 5 TABLET ORAL at 12:44

## 2025-03-21 RX ADMIN — ROSUVASTATIN CALCIUM 10 MG: 10 TABLET, FILM COATED ORAL at 20:33

## 2025-03-21 RX ADMIN — SODIUM CHLORIDE, PRESERVATIVE FREE 10 ML: 5 INJECTION INTRAVENOUS at 20:33

## 2025-03-21 RX ADMIN — SODIUM CHLORIDE 3000 MG: 9 INJECTION, SOLUTION INTRAVENOUS at 00:14

## 2025-03-21 RX ADMIN — POTASSIUM CHLORIDE 40 MEQ: 750 TABLET, FILM COATED, EXTENDED RELEASE ORAL at 10:11

## 2025-03-21 RX ADMIN — Medication 1 CAPSULE: at 18:44

## 2025-03-21 RX ADMIN — SODIUM CHLORIDE 3000 MG: 9 INJECTION, SOLUTION INTRAVENOUS at 05:02

## 2025-03-21 RX ADMIN — MIDODRINE HYDROCHLORIDE 5 MG: 5 TABLET ORAL at 10:11

## 2025-03-21 RX ADMIN — SODIUM CHLORIDE 3000 MG: 9 INJECTION, SOLUTION INTRAVENOUS at 12:40

## 2025-03-21 RX ADMIN — SODIUM CHLORIDE: 0.9 INJECTION, SOLUTION INTRAVENOUS at 00:13

## 2025-03-21 RX ADMIN — WATER 40 MG: 1 INJECTION INTRAMUSCULAR; INTRAVENOUS; SUBCUTANEOUS at 18:44

## 2025-03-21 RX ADMIN — CETIRIZINE HYDROCHLORIDE 5 MG: 10 TABLET, FILM COATED ORAL at 10:12

## 2025-03-21 RX ADMIN — METOPROLOL SUCCINATE 50 MG: 50 TABLET, FILM COATED, EXTENDED RELEASE ORAL at 10:11

## 2025-03-21 RX ADMIN — MIDODRINE HYDROCHLORIDE 5 MG: 5 TABLET ORAL at 16:01

## 2025-03-21 RX ADMIN — DONEPEZIL HYDROCHLORIDE 10 MG: 5 TABLET ORAL at 20:33

## 2025-03-21 RX ADMIN — DIPHENHYDRAMINE HYDROCHLORIDE 25 MG: 25 CAPSULE ORAL at 16:01

## 2025-03-21 RX ADMIN — SODIUM CHLORIDE, PRESERVATIVE FREE 10 ML: 5 INJECTION INTRAVENOUS at 10:19

## 2025-03-21 RX ADMIN — MEROPENEM 1000 MG: 1 INJECTION INTRAVENOUS at 18:09

## 2025-03-21 ASSESSMENT — PAIN SCALES - GENERAL
PAINLEVEL_OUTOF10: 0

## 2025-03-21 NOTE — PROGRESS NOTES
[facility]  [address] [place], [place] [address] [phone]      Hospitalist  Progress Note      NAME:       Rusty Nevarez   :        1945  MRM:        [id number]    Date of service: 3/21/2025      Subjective: Patient seen and examined by me. Patient admitted with sepsis due to a UTI and bacteremia. She also has a liver lesion on CT and had a CT guided biopsy 3/19. She has no pain but nursing noted a possibly new body rash. No fever or chills. Discussed with her spouse, daughter at bedside.       Objective:    Vital Signs:    /88   Pulse 83   Temp 98.6 °F (37 °C) (Oral)   Resp 18   Ht 1.651 m (5' 5\")   Wt 59 kg (130 lb)   SpO2 93%   BMI 21.63 kg/m²        Intake/Output Summary (Last 24 hours) at 3/21/2025 1335  Last data filed at 3/21/2025 0831  Gross per 24 hour   Intake 125 ml   Output --   Net 125 ml        Current inpatient medications reviewed:  Current Facility-Administered Medications   Medication Dose Route Frequency    meropenem (MERREM) 1,000 mg in sodium chloride 0.9 % 100 mL IVPB (addEASE)  1,000 mg IntraVENous Q12H    HYDROmorphone (DILAUDID) injection 0.25 mg  0.25 mg IntraVENous Q4H PRN    benzonatate (TESSALON) capsule 100 mg  100 mg Oral TID PRN    cetirizine (ZYRTEC) tablet 5 mg  5 mg Oral Daily    albuterol sulfate HFA (PROVENTIL;VENTOLIN;PROAIR) 108 (90 Base) MCG/ACT inhaler 2 puff  2 puff Inhalation Q4H PRN    sodium chloride 0.9 % bolus 500 mL  500 mL IntraVENous Once    midodrine (PROAMATINE) tablet 5 mg  5 mg Oral TID WC    haloperidol (HALDOL) tablet 2 mg  2 mg Oral Q6H PRN    haloperidol lactate (HALDOL) injection 2 mg  2 mg IntraVENous Q6H PRN    [Held by provider] OLANZapine (ZYPREXA) tablet 2.5 mg  2.5 mg Oral Nightly    sodium chloride flush 0.9 % injection 5-40 mL  5-40 mL IntraVENous 2 times per day    sodium chloride flush 0.9 % injection 5-40 mL  5-40 mL  nonemergent findings are as described above.  Electronically signed by EAMON DICK    CTA CHEST W WO CONTRAST  Result Date: 3/16/2025  Trace bilateral effusions with overlying atelectasis. No pulmonary embolus or other acute cardiopulmonary process. Incompletely visualized hypodense hepatic lesion which could reflect malignancy. Electronically signed by Jimmy Mckeon    XR CHEST (2 VW)  Result Date: 3/15/2025  Trace bilateral pleural effusions. Electronically signed by Peipto Nixon    Cultures, Imaging studies reviewed and reports noted, Telemetry reviewed and independently interpreted by me and available notes from other care providers - all reviewed by me on: 3/21/2025    Assessment and Plan:    Bacteremia due to Streptococcus / Hepatic abscess POA: her symptoms have progressively improved. Urine cultures isolated E coli and Enterococcus faecium. CT abdomen as noted above. Blood cultures as noted below. Liver biopsy cultures neg thus far. No malignant cells noted on pathology. She has been reviewed by ID. Has been on IV Cefepime, Metronidazole and IV Vancomycin, changed to IV Unasyn but now she has a rash. Discussed with ID. Change to IV Meropenem with plan for IV Invanz at discharge. Awaiting final liver  following     PAF (paroxysmal atrial fibrillation) / Hx of subarachnoid hemorrhage POA: rate controlled. Continue Metoprolol. No anticoagulation    HTN (hypertension), benign / Hypercholesterolemia POA: BP well controlled. Continue Metoprolol, Crestor    Urinary Tract infection POA: urine cultures isolated E coli and Enterococcus faecium. On above IV antibiotics .     Mild dementia POA: no home medications noted. Supportive care    Stage 3a chronic kidney disease POA: was likely worsened by dehydration. SCR now stable. Monitor     Care Plan discussed with: Family, Nursing, CM     Prophylaxis:  SCD's                Expected Disposition:  Home w/Family    PCP:      José Manuel Coulter, DO     I have personally

## 2025-03-21 NOTE — PLAN OF CARE
Problem: Physical Therapy - Adult  Goal: By Discharge: Performs mobility at highest level of function for planned discharge setting.  See evaluation for individualized goals.  Description: FUNCTIONAL STATUS PRIOR TO ADMISSION: Patient was independent and active without use of DME.    HOME SUPPORT PRIOR TO ADMISSION: The patient lived with  has supportive daughter involved as well.    Physical Therapy Goals  Initiated 3/17/2025  1.  Patient will move from supine to sit and sit to supine in bed with modified independence within 7 day(s).    2.  Patient will perform sit to stand with modified independence within 7 day(s).  3.  Patient will transfer from bed to chair and chair to bed with modified independence using the least restrictive device within 7 day(s).  4.  Patient will ambulate with supervision/set-up for 200 feet with the least restrictive device within 7 day(s).   5.  Patient will ascend/descend 4 stairs with 1 handrail(s) with contact guard assist within 7 day(s).  Outcome: Progressing     PHYSICAL THERAPY TREATMENT    Patient: Rusty Nevarez (79 y.o. female)  Date: 3/21/2025  Diagnosis:   Dehydration [E86.0]  Confusion [R41.0]  Flu-like symptoms [R68.89]  Sepsis (HCC) [A41.9]  Sepsis, due to unspecified organism, unspecified whether acute organ dysfunction present (HCC) [A41.9] Sepsis (HCC)      Precautions: Fall Risk, Bed Alarm                      ASSESSMENT:  Pt is an exceptionally pleasant 79 y.o. F who tolerated PT services well and continues to progress toward PT POC goals. Pt received in unsupported EOB sit. PCT/1:1 sitter and family present. Per report, Pt is indep without dme at baseline.    Upon arrival, Pt demo discomfort and itchiness to torso, upper thigh. Observed skin irritation, redness, ?rash? RN Team already aware. Per instruction, lotion applied by PCT/PT Teams at end of session with noted improved comfort.    Pt received on room air, SpO2 ~ 95% -96%. Session completed on

## 2025-03-21 NOTE — CARE COORDINATION
Care Management Progress Note    Reason for Admission:   Dehydration [E86.0]  Confusion [R41.0]  Flu-like symptoms [R68.89]  Sepsis (HCC) [A41.9]  Sepsis, due to unspecified organism, unspecified whether acute organ dysfunction present (HCC) [A41.9]         Patient Admission Status: Inpatient  RUR: 17%  Hospitalization in the last 30 days (Readmission):  No        Transition of care plan:  Ongoing medical management.  ID following for IV abx.  Pending Cx.  Sitter at bedside.    Discharge Plan:    DC on IV abx PENDING final ID recs:  Referral sent to [facility] for review.  Will also need to know if patient will have a co-pay for the IV abx.  Pending PICC placement.     B.  [facility] for SN/PT/OT:  Referral sent pending acceptance.         Discharge plan communicated with patient and/or discharge caregiver: Yes.  CM met with patient at bedside.  Patient is A&Ox2.  CM called patient's spouse and left a message, pending call back.  CM called and spoke to patient's daughter, Bhavana [phone] to discuss the plan.  DTR is anxious about patient DC to home on IV abx, but stated that her and her father are teachable.  CM informed them that a bedside teaching will be done prior to DC.  Also, [facility] nurse will continue to do teaching at home 2-3x a week.  DTR stated full understanding.  CM also recommended SNF, if in case it's necessary, which dtr declined, stating that patient will do better at home with family, as it is a familiar environment.            DTR stated that she does not have any choices of HHC agencies and no choices of IV infusion agencies.  Referral sent.        Date last IMM letter given: 3/17/2025    Outpatient follow-up:  TBD    Transport at discharge: Family      Will continue to follow.    ______________________________________  AMMY Rodriguez, RN-CM  [facility]- Care Management  Available via [employer]  3/21/2025  12:01 PM

## 2025-03-21 NOTE — PROGRESS NOTES
1045- Noticed a new rash  on upper body ( chest, breast, back, stomach, under arms). Notified Dr. Baig. MD assessed pt and will get back to RN with new orders.     1230- Abx will be changed.    1830- Informed MD- rash spread and hurts pt,  pt has 4 loose stools. New orders will be placed.

## 2025-03-21 NOTE — PROGRESS NOTES
Comprehensive Nutrition Assessment    Type and Reason for Visit:  Initial, LOS    Nutrition Recommendations/Plan:   Continue regular, 4 carb choice diet order  Replete K, replete Phos   - Consider 30 mmOl Kphos     Malnutrition Assessment:  Malnutrition Status:  Insufficient data (deferred) (03/21/25 1215)    Context:  Acute Illness     Findings of the 6 clinical characteristics of malnutrition:  Energy Intake:  No decrease in energy intake  Weight Loss:  Unable to assess     Body Fat Loss:  Unable to assess     Muscle Mass Loss:  Unable to assess    Fluid Accumulation:  Unable to assess     Strength:  Normal  strength    Nutrition Assessment:     Patient is a 79 year old female admitted with Dehydration [E86.0]  Confusion [R41.0]  Flu-like symptoms [R68.89]  Sepsis (HCC) [A41.9]  Sepsis, due to unspecified organism, unspecified whether acute organ dysfunction present (HCC) [A41.9]. She  has a past medical history of Atrial fibrillation (HCC), CKD (chronic kidney disease), Hydrocephalus (HCC), Hyperlipidemia, Hypertension, Mild dementia (HCC), Prediabetes, and Subarachnoid bleed (HCC).  RD screen for LOS. Patient w/ mild cognitive impairment baseline A&Ox2 per chart review. PCT bedside assists with information. Patient reports great appetite with no chewing/swallowing issues. PCT states patient wears dentures but has them at home, displays no issues with current texture and is eating well. NKFA. No nausea/vomiting reported. Mild loose stools x 1 per PCT. Patient unsure of UBW. Eating applesauce during RD encounter, declines NFPE at time because she's 'snacking away'. Labs with mild hypokalemia this AM, noted baseline low phos from two days ago. Lack of clearly measured weights in history.    Wt Readings from Last 10 Encounters:   03/16/25 59 kg (130 lb)   02/14/25 56.7 kg (125 lb)   10/11/24 56.3 kg (124 lb 1.6 oz)     Meal Intake:   Patient Vitals for the past 168 hrs:   PO Meals Eaten (%)   03/21/25 0831

## 2025-03-21 NOTE — PROGRESS NOTES
[facility] Progress Note  Chandra Apodaca DO  [phone] Office  [phone] Fax    3/21/2025      Assessment & Plan:     Streptococcus intermedius bacteremia complicated by hepatic abscess.  Repeat blood cultures are sterile to date.  TTE negative for vegetation.  This organism is associated with endocarditis however CHANDNI not necessary as patient will receive an extended course of antibiotics for the hepatic abscess.  Continue Unasyn.  Hepatic abscess.  Likely due to above.  Status post aspiration by IR 3/19.  Cultures NGSF.   Will await culture results to make discharge antibiotic recommendations.  Plan discharge on Monday with a 6-week course of IV antibiotics  Fever and leukocytosis.  Due to above.  WBC slowly trending down.  E. coli and Enterococcus faecium isolated from urine.  UA bland.  Significance unclear.  This will be covered by current antibiotics  CKD 3.  Monitor closely on antibiotics  History of cefazolin and erythromycin allergies.  Tolerating cefepime  Hydrocephalus.  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    Addendum.  Patient has developed a diffuse rash.  She has been on cefepime followed by Unasyn and is not clear with the culprit drug use.  Patient was started on meropenem.  Anticipate discharge on Monday on 6 weeks of Invanz          Subjective:     Patient anxious for discharge    Objective:     Vitals: /88   Pulse 83   Temp 98.6 °F (37 °C) (Oral)   Resp 18   Ht 1.651 m (5' 5\")   Wt 59 kg (130 lb)   SpO2 93%   BMI 21.63 kg/m²      Tmax:  Temp (24hrs), Av.6 °F (37 °C), Min:98.1 °F (36.7 °C), Max:99 °F (37.2 °C)      Exam:   Patient is intubated:  no    Physical Examination:   General:  Alert, cooperative, no distress   Head:  Normocephalic, atraumatic.   Eyes:  Conjunctivae clear   Neck: Supple       Lungs:   No distress.     Chest wall:     Heart:     Abdomen:   non-distended   Extremities: Moves all.    Skin: No

## 2025-03-22 PROBLEM — R21 MACULOPAPULAR RASH, GENERALIZED: Status: ACTIVE | Noted: 2025-03-22

## 2025-03-22 PROBLEM — A41.9 SEPSIS (HCC): Status: RESOLVED | Noted: 2025-03-15 | Resolved: 2025-03-22

## 2025-03-22 LAB
ANION GAP SERPL CALC-SCNC: 4 MMOL/L (ref 2–12)
BACTERIA SPEC CULT: NORMAL
BASOPHILS # BLD: 0 K/UL (ref 0–0.1)
BASOPHILS NFR BLD: 0 % (ref 0–1)
BUN SERPL-MCNC: 18 MG/DL (ref 6–20)
BUN/CREAT SERPL: 22 (ref 12–20)
CALCIUM SERPL-MCNC: 7.6 MG/DL (ref 8.5–10.1)
CHLORIDE SERPL-SCNC: 112 MMOL/L (ref 97–108)
CO2 SERPL-SCNC: 24 MMOL/L (ref 21–32)
CREAT SERPL-MCNC: 0.82 MG/DL (ref 0.55–1.02)
DIFFERENTIAL METHOD BLD: ABNORMAL
EOSINOPHIL # BLD: 0 K/UL (ref 0–0.4)
EOSINOPHIL NFR BLD: 0 % (ref 0–7)
ERYTHROCYTE [DISTWIDTH] IN BLOOD BY AUTOMATED COUNT: 16 % (ref 11.5–14.5)
GLUCOSE BLD STRIP.AUTO-MCNC: 148 MG/DL (ref 65–117)
GLUCOSE BLD STRIP.AUTO-MCNC: 159 MG/DL (ref 65–117)
GLUCOSE BLD STRIP.AUTO-MCNC: 164 MG/DL (ref 65–117)
GLUCOSE BLD STRIP.AUTO-MCNC: 180 MG/DL (ref 65–117)
GLUCOSE SERPL-MCNC: 175 MG/DL (ref 65–100)
GRAM STN SPEC: NORMAL
GRAM STN SPEC: NORMAL
HCT VFR BLD AUTO: 26.1 % (ref 35–47)
HGB BLD-MCNC: 8.8 G/DL (ref 11.5–16)
IMM GRANULOCYTES # BLD AUTO: 0 K/UL
IMM GRANULOCYTES NFR BLD AUTO: 0 %
LYMPHOCYTES # BLD: 1.28 K/UL (ref 0.8–3.5)
LYMPHOCYTES NFR BLD: 7 % (ref 12–49)
MCH RBC QN AUTO: 28.9 PG (ref 26–34)
MCHC RBC AUTO-ENTMCNC: 33.7 G/DL (ref 30–36.5)
MCV RBC AUTO: 85.9 FL (ref 80–99)
METAMYELOCYTES NFR BLD MANUAL: 1 %
MONOCYTES # BLD: 0.18 K/UL (ref 0–1)
MONOCYTES NFR BLD: 1 % (ref 5–13)
NEUTS BAND NFR BLD MANUAL: 4 % (ref 0–6)
NEUTS SEG # BLD: 16.65 K/UL (ref 1.8–8)
NEUTS SEG NFR BLD: 87 % (ref 32–75)
NRBC # BLD: 0 K/UL (ref 0–0.01)
NRBC BLD-RTO: 0 PER 100 WBC
PLATELET # BLD AUTO: 306 K/UL (ref 150–400)
PMV BLD AUTO: 11.3 FL (ref 8.9–12.9)
POTASSIUM SERPL-SCNC: 4.2 MMOL/L (ref 3.5–5.1)
RBC # BLD AUTO: 3.04 M/UL (ref 3.8–5.2)
RBC MORPH BLD: ABNORMAL
SERVICE CMNT-IMP: ABNORMAL
SERVICE CMNT-IMP: NORMAL
SODIUM SERPL-SCNC: 140 MMOL/L (ref 136–145)
WBC # BLD AUTO: 18.3 K/UL (ref 3.6–11)

## 2025-03-22 PROCEDURE — 99232 SBSQ HOSP IP/OBS MODERATE 35: CPT | Performed by: INTERNAL MEDICINE

## 2025-03-22 PROCEDURE — 6360000002 HC RX W HCPCS: Performed by: INTERNAL MEDICINE

## 2025-03-22 PROCEDURE — 2500000003 HC RX 250 WO HCPCS: Performed by: HOSPITALIST

## 2025-03-22 PROCEDURE — 82962 GLUCOSE BLOOD TEST: CPT

## 2025-03-22 PROCEDURE — 6370000000 HC RX 637 (ALT 250 FOR IP): Performed by: INTERNAL MEDICINE

## 2025-03-22 PROCEDURE — 2700000000 HC OXYGEN THERAPY PER DAY

## 2025-03-22 PROCEDURE — 6370000000 HC RX 637 (ALT 250 FOR IP): Performed by: HOSPITALIST

## 2025-03-22 PROCEDURE — 2580000003 HC RX 258: Performed by: INTERNAL MEDICINE

## 2025-03-22 PROCEDURE — 36415 COLL VENOUS BLD VENIPUNCTURE: CPT

## 2025-03-22 PROCEDURE — 80048 BASIC METABOLIC PNL TOTAL CA: CPT

## 2025-03-22 PROCEDURE — 1100000000 HC RM PRIVATE

## 2025-03-22 PROCEDURE — 94761 N-INVAS EAR/PLS OXIMETRY MLT: CPT

## 2025-03-22 PROCEDURE — 85025 COMPLETE CBC W/AUTO DIFF WBC: CPT

## 2025-03-22 RX ORDER — PREDNISONE 20 MG/1
20 TABLET ORAL 2 TIMES DAILY
Status: COMPLETED | OUTPATIENT
Start: 2025-03-22 | End: 2025-03-23

## 2025-03-22 RX ADMIN — METOPROLOL SUCCINATE 50 MG: 50 TABLET, FILM COATED, EXTENDED RELEASE ORAL at 09:19

## 2025-03-22 RX ADMIN — MEROPENEM 1000 MG: 1 INJECTION INTRAVENOUS at 16:45

## 2025-03-22 RX ADMIN — DIPHENHYDRAMINE HYDROCHLORIDE 25 MG: 25 CAPSULE ORAL at 21:59

## 2025-03-22 RX ADMIN — MEROPENEM 1000 MG: 1 INJECTION INTRAVENOUS at 05:44

## 2025-03-22 RX ADMIN — PREDNISONE 20 MG: 20 TABLET ORAL at 10:30

## 2025-03-22 RX ADMIN — MIDODRINE HYDROCHLORIDE 5 MG: 5 TABLET ORAL at 09:18

## 2025-03-22 RX ADMIN — SODIUM CHLORIDE, PRESERVATIVE FREE 10 ML: 5 INJECTION INTRAVENOUS at 21:24

## 2025-03-22 RX ADMIN — SODIUM CHLORIDE, PRESERVATIVE FREE 10 ML: 5 INJECTION INTRAVENOUS at 09:20

## 2025-03-22 RX ADMIN — MEROPENEM 1000 MG: 1 INJECTION INTRAVENOUS at 23:43

## 2025-03-22 RX ADMIN — PREDNISONE 20 MG: 20 TABLET ORAL at 21:24

## 2025-03-22 RX ADMIN — MIDODRINE HYDROCHLORIDE 5 MG: 5 TABLET ORAL at 12:30

## 2025-03-22 RX ADMIN — DONEPEZIL HYDROCHLORIDE 10 MG: 5 TABLET ORAL at 21:24

## 2025-03-22 RX ADMIN — DIPHENHYDRAMINE HYDROCHLORIDE 25 MG: 25 CAPSULE ORAL at 11:06

## 2025-03-22 RX ADMIN — Medication 1 CAPSULE: at 09:19

## 2025-03-22 RX ADMIN — CETIRIZINE HYDROCHLORIDE 5 MG: 10 TABLET, FILM COATED ORAL at 09:19

## 2025-03-22 RX ADMIN — MIDODRINE HYDROCHLORIDE 5 MG: 5 TABLET ORAL at 16:39

## 2025-03-22 RX ADMIN — ROSUVASTATIN CALCIUM 10 MG: 10 TABLET, FILM COATED ORAL at 21:24

## 2025-03-22 RX ADMIN — INSULIN LISPRO 2 UNITS: 100 INJECTION, SOLUTION INTRAVENOUS; SUBCUTANEOUS at 21:24

## 2025-03-22 ASSESSMENT — PAIN SCALES - GENERAL
PAINLEVEL_OUTOF10: 0

## 2025-03-22 NOTE — PROGRESS NOTES
[facility] Progress Note  Chandra Apodaca DO  [phone] Office  [phone] Fax    3/22/2025      Assessment & Plan:     Streptococcus intermedius bacteremia complicated by hepatic abscess.  Repeat blood cultures are sterile to date.  TTE negative for vegetation.  This organism is associated with endocarditis however CHANDNI not necessary as patient will receive an extended course of antibiotics for the hepatic abscess.    Hepatic abscess.  Likely due to above.  Status post aspiration by IR 3/19.  Cultures NGSF.   Will await culture results to make discharge antibiotic recommendations.  Plan discharge on Monday with a 6-week course of IV antibiotics  Diffuse maculopapular rash.  Suspect drug reaction however it is not clear if this was due to cefepime or Unasyn.  Antibiotics changed to meropenem.  Anticipate discharge on ertapenem x 6 weeks depending on cultures from hepatic abscess  Fever and leukocytosis.  Due to above.  WBC slowly trending down.  E. coli and Enterococcus faecium isolated from urine.  UA bland.  Significance unclear.  This will be covered by current antibiotics  CKD 3.  Monitor closely on antibiotics  History of cefazolin and erythromycin allergies.  Tolerating cefepime  Hydrocephalus.  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            Subjective:     Patient anxious for discharge    Objective:     Vitals: /64   Pulse 62   Temp 98.2 °F (36.8 °C) (Oral)   Resp 19   Ht 1.651 m (5' 5\")   Wt 65.8 kg (145 lb 1 oz)   SpO2 96%   BMI 24.14 kg/m²      Tmax:  Temp (24hrs), Av.2 °F (36.8 °C), Min:97.5 °F (36.4 °C), Max:98.8 °F (37.1 °C)      Exam:   Patient is intubated:  no    Physical Examination:   General:  Alert, cooperative, no distress   Head:  Normocephalic, atraumatic.   Eyes:  Conjunctivae clear   Neck: Supple       Lungs:   No distress.     Chest wall:     Heart:     Abdomen:   non-distended   Extremities: Moves all.

## 2025-03-22 NOTE — PROGRESS NOTES
[facility]  [address] [place], [place] [id number] [phone]      Hospitalist  Progress Note      NAME:       Rusty Nevarez   :        1945  MRM:        [id number]    Date of service: 3/22/2025      Subjective: Patient seen and examined by me. Patient admitted with bacteremia due to a hepatic abscess. She was noted to have a maculopapular rash 3/21. This is generalized but seems to be improving. Minimal itchiness. No fever or chills.      Objective:    Vital Signs:    /64   Pulse 62   Temp 98.2 °F (36.8 °C) (Oral)   Resp 19   Ht 1.651 m (5' 5\")   Wt 65.8 kg (145 lb 1 oz)   SpO2 96%   BMI 24.14 kg/m²        Intake/Output Summary (Last 24 hours) at 3/22/2025 1152  Last data filed at 3/21/2025 1726  Gross per 24 hour   Intake 245 ml   Output --   Net 245 ml        Current inpatient medications reviewed:  Current Facility-Administered Medications   Medication Dose Route Frequency    predniSONE (DELTASONE) tablet 20 mg  20 mg Oral BID    meropenem (MERREM) 1,000 mg in sodium chloride 0.9 % 100 mL IVPB (addEASE)  1,000 mg IntraVENous Q12H    diphenhydrAMINE (BENADRYL) capsule 25 mg  25 mg Oral Q6H PRN    lactobacillus (CULTURELLE) capsule 1 capsule  1 capsule Oral Daily with breakfast    HYDROmorphone (DILAUDID) injection 0.25 mg  0.25 mg IntraVENous Q4H PRN    benzonatate (TESSALON) capsule 100 mg  100 mg Oral TID PRN    cetirizine (ZYRTEC) tablet 5 mg  5 mg Oral Daily    albuterol sulfate HFA (PROVENTIL;VENTOLIN;PROAIR) 108 (90 Base) MCG/ACT inhaler 2 puff  2 puff Inhalation Q4H PRN    sodium chloride 0.9 % bolus 500 mL  500 mL IntraVENous Once    midodrine (PROAMATINE) tablet 5 mg  5 mg Oral TID WC    haloperidol (HALDOL) tablet 2 mg  2 mg Oral Q6H PRN    haloperidol lactate (HALDOL) injection 2 mg  2 mg IntraVENous Q6H PRN    [Held by provider] OLANZapine (ZYPREXA) tablet 2.5 mg  2.5 mg Oral

## 2025-03-23 LAB
BACTERIA SPEC CULT: NORMAL
BACTERIA SPEC CULT: NORMAL
GLUCOSE BLD STRIP.AUTO-MCNC: 145 MG/DL (ref 65–117)
GLUCOSE BLD STRIP.AUTO-MCNC: 152 MG/DL (ref 65–117)
GLUCOSE BLD STRIP.AUTO-MCNC: 192 MG/DL (ref 65–117)
GLUCOSE BLD STRIP.AUTO-MCNC: 205 MG/DL (ref 65–117)
MAGNESIUM SERPL-MCNC: 2.1 MG/DL (ref 1.6–2.4)
SERVICE CMNT-IMP: ABNORMAL
SERVICE CMNT-IMP: NORMAL
SERVICE CMNT-IMP: NORMAL

## 2025-03-23 PROCEDURE — 82962 GLUCOSE BLOOD TEST: CPT

## 2025-03-23 PROCEDURE — 83735 ASSAY OF MAGNESIUM: CPT

## 2025-03-23 PROCEDURE — 6360000002 HC RX W HCPCS: Performed by: INTERNAL MEDICINE

## 2025-03-23 PROCEDURE — 6370000000 HC RX 637 (ALT 250 FOR IP): Performed by: INTERNAL MEDICINE

## 2025-03-23 PROCEDURE — 99232 SBSQ HOSP IP/OBS MODERATE 35: CPT | Performed by: INTERNAL MEDICINE

## 2025-03-23 PROCEDURE — 1100000000 HC RM PRIVATE

## 2025-03-23 PROCEDURE — 6370000000 HC RX 637 (ALT 250 FOR IP): Performed by: STUDENT IN AN ORGANIZED HEALTH CARE EDUCATION/TRAINING PROGRAM

## 2025-03-23 PROCEDURE — 94761 N-INVAS EAR/PLS OXIMETRY MLT: CPT

## 2025-03-23 PROCEDURE — 6370000000 HC RX 637 (ALT 250 FOR IP): Performed by: HOSPITALIST

## 2025-03-23 PROCEDURE — 2580000003 HC RX 258: Performed by: INTERNAL MEDICINE

## 2025-03-23 PROCEDURE — 36415 COLL VENOUS BLD VENIPUNCTURE: CPT

## 2025-03-23 PROCEDURE — 2500000003 HC RX 250 WO HCPCS: Performed by: HOSPITALIST

## 2025-03-23 RX ORDER — DIPHENHYDRAMINE HCL 25 MG
50 CAPSULE ORAL
Status: COMPLETED | OUTPATIENT
Start: 2025-03-23 | End: 2025-03-23

## 2025-03-23 RX ADMIN — SODIUM CHLORIDE, PRESERVATIVE FREE 10 ML: 5 INJECTION INTRAVENOUS at 08:30

## 2025-03-23 RX ADMIN — MIDODRINE HYDROCHLORIDE 5 MG: 5 TABLET ORAL at 16:39

## 2025-03-23 RX ADMIN — MEROPENEM 1000 MG: 1 INJECTION INTRAVENOUS at 23:23

## 2025-03-23 RX ADMIN — MEROPENEM 1000 MG: 1 INJECTION INTRAVENOUS at 07:12

## 2025-03-23 RX ADMIN — DONEPEZIL HYDROCHLORIDE 10 MG: 5 TABLET ORAL at 20:10

## 2025-03-23 RX ADMIN — PREDNISONE 20 MG: 20 TABLET ORAL at 20:10

## 2025-03-23 RX ADMIN — METOPROLOL SUCCINATE 50 MG: 50 TABLET, FILM COATED, EXTENDED RELEASE ORAL at 08:21

## 2025-03-23 RX ADMIN — CETIRIZINE HYDROCHLORIDE 5 MG: 10 TABLET, FILM COATED ORAL at 08:22

## 2025-03-23 RX ADMIN — SODIUM CHLORIDE, PRESERVATIVE FREE 10 ML: 5 INJECTION INTRAVENOUS at 20:09

## 2025-03-23 RX ADMIN — DIPHENHYDRAMINE HYDROCHLORIDE 25 MG: 25 CAPSULE ORAL at 11:25

## 2025-03-23 RX ADMIN — MIDODRINE HYDROCHLORIDE 5 MG: 5 TABLET ORAL at 08:29

## 2025-03-23 RX ADMIN — DIPHENHYDRAMINE HYDROCHLORIDE 50 MG: 25 CAPSULE ORAL at 16:39

## 2025-03-23 RX ADMIN — INSULIN LISPRO 2 UNITS: 100 INJECTION, SOLUTION INTRAVENOUS; SUBCUTANEOUS at 08:22

## 2025-03-23 RX ADMIN — ENOXAPARIN SODIUM 40 MG: 100 INJECTION SUBCUTANEOUS at 08:22

## 2025-03-23 RX ADMIN — ROSUVASTATIN CALCIUM 10 MG: 10 TABLET, FILM COATED ORAL at 20:09

## 2025-03-23 RX ADMIN — INSULIN LISPRO 2 UNITS: 100 INJECTION, SOLUTION INTRAVENOUS; SUBCUTANEOUS at 20:17

## 2025-03-23 RX ADMIN — Medication 1 CAPSULE: at 08:21

## 2025-03-23 RX ADMIN — PREDNISONE 20 MG: 20 TABLET ORAL at 08:22

## 2025-03-23 RX ADMIN — MIDODRINE HYDROCHLORIDE 5 MG: 5 TABLET ORAL at 11:25

## 2025-03-23 RX ADMIN — MEROPENEM 1000 MG: 1 INJECTION INTRAVENOUS at 14:54

## 2025-03-23 NOTE — PLAN OF CARE
Problem: Chronic Conditions and Co-morbidities  Goal: Patient's chronic conditions and co-morbidity symptoms are monitored and maintained or improved  Outcome: Progressing  Flowsheets (Taken 3/22/2025 2130)  Care Plan - Patient's Chronic Conditions and Co-Morbidity Symptoms are Monitored and Maintained or Improved: Monitor and assess patient's chronic conditions and comorbid symptoms for stability, deterioration, or improvement     Problem: Safety - Adult  Goal: Free from fall injury  Outcome: Progressing     Problem: Respiratory - Adult  Goal: Achieves optimal ventilation and oxygenation  Outcome: Progressing  Flowsheets (Taken 3/22/2025 2130)  Achieves optimal ventilation and oxygenation:   Assess for changes in respiratory status   Assess for changes in mentation and behavior     Problem: Discharge Planning  Goal: Discharge to home or other facility with appropriate resources  Outcome: Progressing  Flowsheets (Taken 3/22/2025 2130)  Discharge to home or other facility with appropriate resources:   Identify barriers to discharge with patient and caregiver   Identify discharge learning needs (meds, wound care, etc)     Problem: Skin/Tissue Integrity  Goal: Skin integrity remains intact  Description: 1.  Monitor for areas of redness and/or skin breakdown  2.  Assess vascular access sites hourly  3.  Every 4-6 hours minimum:  Change oxygen saturation probe site  4.  Every 4-6 hours:  If on nasal continuous positive airway pressure, respiratory therapy assess nares and determine need for appliance change or resting period  Outcome: Progressing  Flowsheets (Taken 3/22/2025 2130)  Skin Integrity Remains Intact: Monitor for areas of redness and/or skin breakdown

## 2025-03-23 NOTE — PROGRESS NOTES
0720: Bedside report received from PHIL Osman.  Pt in bed with sitter to bedside, no s/s of distress.    1105: Copy of pts Advance Directive placed on pt chart.    1126:Admin PRN benadryl per pt c/o itching.    1448: Family to bedside with questions for MD.  MD paged.    1530: Pt with increased itching and rash appears to be spreading per family.  Also lesions noted to pts tongue though no c/o difficulty swallowing or breathing.   Merrem stopped and MD made aware, no PRNs available at this time.    1618: Order received for 50mg PO Benadryl now, received instructions to monitor for respiratory symptoms.    1636: ID MD rounding, plan to continue Merrem and monitor symptoms.  Rash appears to be improving per ID, admin 1x dose Benadryl as ordered and reach out to attending for recurrent PRN med for itching.    1705: No new orders at this time, per attending continue to monitor and reach out with change in status.    1900: Bedside report given to PHIL Khan.

## 2025-03-23 NOTE — PROGRESS NOTES
[facility]  [address] [place] [address] [phone]        Hospitalist Progress Note      NAME: Rusty Nevarez   :  1945  MRM:  [id number]    Date/Time of service: 3/23/2025  12:32 PM       Subjective:     Chief Complaint:  Patient was personally seen and examined by me during this time period.  Chart reviewed.  F/up sepsis, UTI, strep bacteremia, hepatic abscess, rash    Feeling well this morning. No abd pain. Denies fevers or chills. No dysuria. No nausea or vomiting. Rash is improving       Objective:       Vitals:       Last 24hrs VS reviewed since prior progress note. Most recent are:    Vitals:    25 1030   BP: (!) 131/57   Pulse: 60   Resp: 19   Temp: 98.6 °F (37 °C)   SpO2: 92%     SpO2 Readings from Last 6 Encounters:   25 92%   25 99%   10/11/24 96%          Intake/Output Summary (Last 24 hours) at 3/23/2025 1232  Last data filed at 3/23/2025 1200  Gross per 24 hour   Intake 1153.98 ml   Output --   Net 1153.98 ml        Exam:     Physical Exam:    Gen:  in no acute distress  HEENT:  Pink conjunctivae, EOMI, hearing intact to voice, moist mucous membranes  Resp:  No accessory muscle use, clear breath sounds without wheezes rales or rhonchi  Card:  No murmurs, normal S1, S2 without thrills, bruits or peripheral edema  Abd:  Soft, non-tender, non-distended, no palpable organomegaly and no detectable hernias  Musc:  No cyanosis or clubbing  Skin:  erythematous macular rash on abdomen, thighs and right arm. Not warm, tender or itchy  Neuro: follows commands appropriately  Psych:  poor insight, oriented to person, place and time, alert. Very pleasant      Medications Reviewed: (see below)    Lab Data Reviewed: (see below)    ______________________________________________________________________    Medications:     Current Facility-Administered Medications   Medication Dose Route Frequency    predniSONE (DELTASONE) tablet 20 mg  20 mg

## 2025-03-23 NOTE — PROGRESS NOTES
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  Repeat blood cultures are sterile to date.  TTE negative for vegetation.  This organism is associated with endocarditis however CHANDNI not necessary as patient will receive an extended course of antibiotics for the hepatic abscess.    Hepatic abscess.  Likely due to above.  Status post aspiration by IR 3/19.  Cultures sterile at final reading.  As long as the rash remains stable plan discharge on Monday with a 6-week course of IV antibiotics  Diffuse maculopapular rash.  Suspect drug reaction however it is not clear if this was due to cefepime or Unasyn.  Antibiotics changed to meropenem.  I was called today regarding concern for worsening rash however rash appears to be stable/improved.  Will continue meropenem with plan to discharge on ertapenem x 6 weeks depending on what the rash looks like tomorrow.    Fever and leukocytosis.  Due to above.  WBC slowly trending down.  E. coli and Enterococcus faecium isolated from urine.  UA bland.  Significance unclear.  This will be covered by current antibiotics  CKD 3.  Monitor closely on antibiotics  History of cefazolin and erythromycin allergies.  Tolerating cefepime  Hydrocephalus.  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            Subjective:     Patient anxious for discharge    Objective:     Vitals: BP (!) 129/58   Pulse 54   Temp 97.7 °F (36.5 °C) (Oral)   Resp 18   Ht 1.651 m (5' 5\")   Wt 65.8 kg (145 lb 1 oz)   SpO2 90%   BMI 24.14 kg/m²      Tmax:  Temp (24hrs), Av.2 °F (36.8 °C), Min:97.7 °F (36.5 °C), Max:98.6 °F (37 °C)      Exam:   Patient is intubated:  no    Physical Examination:   General:  Alert, cooperative, no distress   Head:  Normocephalic, atraumatic.   Eyes:  Conjunctivae clear   Neck:

## 2025-03-24 PROBLEM — A49.1 STREPTOCOCCUS INFECTION: Status: ACTIVE | Noted: 2025-03-24

## 2025-03-24 PROBLEM — R78.81 BACTEREMIA: Status: ACTIVE | Noted: 2025-03-24

## 2025-03-24 PROBLEM — A49.1 ENTEROCOCCAL INFECTION: Status: ACTIVE | Noted: 2025-03-24

## 2025-03-24 PROBLEM — N18.9 CHRONIC KIDNEY DISEASE: Status: ACTIVE | Noted: 2025-03-24

## 2025-03-24 PROBLEM — Z88.1 ALLERGY TO ANTIBIOTIC: Status: ACTIVE | Noted: 2025-03-24

## 2025-03-24 PROBLEM — R21 RASH: Status: ACTIVE | Noted: 2025-03-24

## 2025-03-24 LAB
BASOPHILS # BLD: 0 K/UL (ref 0–0.1)
BASOPHILS NFR BLD: 0 % (ref 0–1)
DIFFERENTIAL METHOD BLD: ABNORMAL
EOSINOPHIL # BLD: 0 K/UL (ref 0–0.4)
EOSINOPHIL NFR BLD: 0 % (ref 0–7)
ERYTHROCYTE [DISTWIDTH] IN BLOOD BY AUTOMATED COUNT: 15.9 % (ref 11.5–14.5)
GLUCOSE BLD STRIP.AUTO-MCNC: 107 MG/DL (ref 65–117)
GLUCOSE BLD STRIP.AUTO-MCNC: 123 MG/DL (ref 65–117)
GLUCOSE BLD STRIP.AUTO-MCNC: 152 MG/DL (ref 65–117)
GLUCOSE BLD STRIP.AUTO-MCNC: 156 MG/DL (ref 65–117)
GLUCOSE BLD STRIP.AUTO-MCNC: 175 MG/DL (ref 65–117)
HCT VFR BLD AUTO: 26.1 % (ref 35–47)
HGB BLD-MCNC: 8.6 G/DL (ref 11.5–16)
IMM GRANULOCYTES # BLD AUTO: 0 K/UL
IMM GRANULOCYTES NFR BLD AUTO: 0 %
LYMPHOCYTES # BLD: 2.18 K/UL (ref 0.8–3.5)
LYMPHOCYTES NFR BLD: 14 % (ref 12–49)
MCH RBC QN AUTO: 28.7 PG (ref 26–34)
MCHC RBC AUTO-ENTMCNC: 33 G/DL (ref 30–36.5)
MCV RBC AUTO: 87 FL (ref 80–99)
MONOCYTES # BLD: 0.31 K/UL (ref 0–1)
MONOCYTES NFR BLD: 2 % (ref 5–13)
MYELOCYTES NFR BLD MANUAL: 1 %
NEUTS BAND NFR BLD MANUAL: 1 % (ref 0–6)
NEUTS SEG # BLD: 12.95 K/UL (ref 1.8–8)
NEUTS SEG NFR BLD: 82 % (ref 32–75)
NRBC # BLD: 0 K/UL (ref 0–0.01)
NRBC BLD-RTO: 0 PER 100 WBC
PLATELET # BLD AUTO: 335 K/UL (ref 150–400)
PMV BLD AUTO: 11.5 FL (ref 8.9–12.9)
RBC # BLD AUTO: 3 M/UL (ref 3.8–5.2)
RBC MORPH BLD: ABNORMAL
SERVICE CMNT-IMP: ABNORMAL
SERVICE CMNT-IMP: NORMAL
WBC # BLD AUTO: 15.6 K/UL (ref 3.6–11)

## 2025-03-24 PROCEDURE — 2580000003 HC RX 258

## 2025-03-24 PROCEDURE — 6370000000 HC RX 637 (ALT 250 FOR IP): Performed by: INTERNAL MEDICINE

## 2025-03-24 PROCEDURE — 6360000002 HC RX W HCPCS: Performed by: INTERNAL MEDICINE

## 2025-03-24 PROCEDURE — 2500000003 HC RX 250 WO HCPCS: Performed by: STUDENT IN AN ORGANIZED HEALTH CARE EDUCATION/TRAINING PROGRAM

## 2025-03-24 PROCEDURE — 97116 GAIT TRAINING THERAPY: CPT

## 2025-03-24 PROCEDURE — 36415 COLL VENOUS BLD VENIPUNCTURE: CPT

## 2025-03-24 PROCEDURE — 6370000000 HC RX 637 (ALT 250 FOR IP)

## 2025-03-24 PROCEDURE — 2580000003 HC RX 258: Performed by: INTERNAL MEDICINE

## 2025-03-24 PROCEDURE — 2500000003 HC RX 250 WO HCPCS: Performed by: HOSPITALIST

## 2025-03-24 PROCEDURE — 1100000000 HC RM PRIVATE

## 2025-03-24 PROCEDURE — 6370000000 HC RX 637 (ALT 250 FOR IP): Performed by: STUDENT IN AN ORGANIZED HEALTH CARE EDUCATION/TRAINING PROGRAM

## 2025-03-24 PROCEDURE — 85025 COMPLETE CBC W/AUTO DIFF WBC: CPT

## 2025-03-24 PROCEDURE — 6370000000 HC RX 637 (ALT 250 FOR IP): Performed by: HOSPITALIST

## 2025-03-24 PROCEDURE — 99233 SBSQ HOSP IP/OBS HIGH 50: CPT

## 2025-03-24 PROCEDURE — 6360000002 HC RX W HCPCS

## 2025-03-24 PROCEDURE — 97535 SELF CARE MNGMENT TRAINING: CPT

## 2025-03-24 PROCEDURE — 94761 N-INVAS EAR/PLS OXIMETRY MLT: CPT

## 2025-03-24 PROCEDURE — 82962 GLUCOSE BLOOD TEST: CPT

## 2025-03-24 RX ORDER — SODIUM CHLORIDE 0.9 % (FLUSH) 0.9 %
5-40 SYRINGE (ML) INJECTION EVERY 12 HOURS SCHEDULED
Status: DISCONTINUED | OUTPATIENT
Start: 2025-03-24 | End: 2025-03-28 | Stop reason: HOSPADM

## 2025-03-24 RX ORDER — SODIUM CHLORIDE 0.9 % (FLUSH) 0.9 %
5-40 SYRINGE (ML) INJECTION PRN
Status: DISCONTINUED | OUTPATIENT
Start: 2025-03-24 | End: 2025-03-28 | Stop reason: HOSPADM

## 2025-03-24 RX ORDER — DIPHENHYDRAMINE HCL 25 MG
25 CAPSULE ORAL 2 TIMES DAILY
Status: DISCONTINUED | OUTPATIENT
Start: 2025-03-24 | End: 2025-03-28 | Stop reason: HOSPADM

## 2025-03-24 RX ORDER — SODIUM CHLORIDE 9 MG/ML
INJECTION, SOLUTION INTRAVENOUS PRN
Status: DISCONTINUED | OUTPATIENT
Start: 2025-03-24 | End: 2025-03-28 | Stop reason: HOSPADM

## 2025-03-24 RX ORDER — AZTREONAM 2 G/1
2000 INJECTION, POWDER, LYOPHILIZED, FOR SOLUTION INTRAMUSCULAR; INTRAVENOUS EVERY 8 HOURS
Status: DISCONTINUED | OUTPATIENT
Start: 2025-03-24 | End: 2025-03-24

## 2025-03-24 RX ORDER — LIDOCAINE HYDROCHLORIDE 10 MG/ML
50 INJECTION, SOLUTION EPIDURAL; INFILTRATION; INTRACAUDAL; PERINEURAL ONCE
Status: DISCONTINUED | OUTPATIENT
Start: 2025-03-24 | End: 2025-03-28 | Stop reason: HOSPADM

## 2025-03-24 RX ORDER — METRONIDAZOLE 500 MG/1
500 TABLET ORAL EVERY 8 HOURS SCHEDULED
Status: DISCONTINUED | OUTPATIENT
Start: 2025-03-24 | End: 2025-03-28

## 2025-03-24 RX ORDER — PREDNISONE 20 MG/1
20 TABLET ORAL DAILY
Status: COMPLETED | OUTPATIENT
Start: 2025-03-24 | End: 2025-03-27

## 2025-03-24 RX ADMIN — Medication 1 CAPSULE: at 10:22

## 2025-03-24 RX ADMIN — MIDODRINE HYDROCHLORIDE 5 MG: 5 TABLET ORAL at 10:12

## 2025-03-24 RX ADMIN — DIPHENHYDRAMINE HYDROCHLORIDE 25 MG: 25 CAPSULE ORAL at 11:41

## 2025-03-24 RX ADMIN — PREDNISONE 20 MG: 20 TABLET ORAL at 11:41

## 2025-03-24 RX ADMIN — DIPHENHYDRAMINE HYDROCHLORIDE 25 MG: 25 CAPSULE ORAL at 21:40

## 2025-03-24 RX ADMIN — ENOXAPARIN SODIUM 40 MG: 100 INJECTION SUBCUTANEOUS at 10:12

## 2025-03-24 RX ADMIN — MEROPENEM 1000 MG: 1 INJECTION INTRAVENOUS at 07:10

## 2025-03-24 RX ADMIN — CETIRIZINE HYDROCHLORIDE 5 MG: 10 TABLET, FILM COATED ORAL at 10:12

## 2025-03-24 RX ADMIN — MIDODRINE HYDROCHLORIDE 5 MG: 5 TABLET ORAL at 12:16

## 2025-03-24 RX ADMIN — SODIUM CHLORIDE, PRESERVATIVE FREE 10 ML: 5 INJECTION INTRAVENOUS at 10:18

## 2025-03-24 RX ADMIN — METRONIDAZOLE 500 MG: 500 TABLET ORAL at 21:39

## 2025-03-24 RX ADMIN — DONEPEZIL HYDROCHLORIDE 10 MG: 5 TABLET ORAL at 21:40

## 2025-03-24 RX ADMIN — SODIUM CHLORIDE, PRESERVATIVE FREE 10 ML: 5 INJECTION INTRAVENOUS at 21:40

## 2025-03-24 RX ADMIN — METRONIDAZOLE 500 MG: 500 TABLET ORAL at 14:40

## 2025-03-24 RX ADMIN — AZTREONAM 2000 MG: 2 INJECTION, POWDER, LYOPHILIZED, FOR SOLUTION INTRAMUSCULAR; INTRAVENOUS at 22:52

## 2025-03-24 RX ADMIN — METOPROLOL SUCCINATE 50 MG: 50 TABLET, FILM COATED, EXTENDED RELEASE ORAL at 10:12

## 2025-03-24 RX ADMIN — MIDODRINE HYDROCHLORIDE 5 MG: 5 TABLET ORAL at 17:33

## 2025-03-24 RX ADMIN — SODIUM CHLORIDE 1500 MG: 0.9 INJECTION, SOLUTION INTRAVENOUS at 14:48

## 2025-03-24 RX ADMIN — AZTREONAM 2000 MG: 2 INJECTION, POWDER, LYOPHILIZED, FOR SOLUTION INTRAMUSCULAR; INTRAVENOUS at 14:53

## 2025-03-24 RX ADMIN — ROSUVASTATIN CALCIUM 10 MG: 10 TABLET, FILM COATED ORAL at 21:40

## 2025-03-24 NOTE — PLAN OF CARE
Problem: Occupational Therapy - Adult  Goal: By Discharge: Performs self-care activities at highest level of function for planned discharge setting.  See evaluation for individualized goals.  Description: FUNCTIONAL STATUS PRIOR TO ADMISSION:  Patient is normally independent with ADLs and mobility without use of AD/DME.  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  No home O2 use.     HOME SUPPORT: Patient lived with her . Has a very supportive daughter who lives locally as well.    Occupational Therapy Goals:  Initiated 3/17/2025, continued 3/24/2025  1.  Patient will perform grooming, standing at sink, with Modified [place] within 7 day(s).  2.  Patient will perform lower body dressing with Supervision within 7 day(s).  3.  Patient will perform bathing from neck to knees with Supervision within 7 day(s).  4.  Patient will perform toilet transfers with Supervision  within 7 day(s).  5.  Patient will perform all aspects of toileting with Supervision within 7 day(s).  6.  Patient will participate in upper extremity therapeutic exercise/activities with Supervision for 10 minutes within 7 day(s).    7.  Patient will utilize energy conservation techniques during functional activities with verbal cues within 7 day(s).  Outcome: Progressing     OCCUPATIONAL THERAPY TREATMENT  Patient: Rusty Nevarez (79 y.o. female)  Date: 3/24/2025  Primary Diagnosis: Dehydration [E86.0]  Confusion [R41.0]  Flu-like symptoms [R68.89]  Sepsis (HCC) [A41.9]  Sepsis, due to unspecified organism, unspecified whether acute organ dysfunction present (HCC) [A41.9]       Precautions: Fall Risk, Bed Alarm                Chart, occupational therapy assessment, plan of care, and goals were reviewed.    ASSESSMENT  Patient continues to benefit from skilled OT services and is progressing towards goals.  She remains limited by mildly impaired standing balance and impaired cognition  Factors: rinsed/ put in false teeth standing at sink.  required cuing to initiate handwashing after toileting.  applied soap to elbows and declined rinsing- required max curing + encouragement to rinse off soap- family reported this has been an issue at home as well                                Toileting: Supervision (was continent of urine in bathroom and managed clothing and hygiene)                 Pain Rating:  Patient did not report pain      Activity Tolerance:   Good    After treatment:   Patient left in no apparent distress sitting up in chair, Call bell within reach, and Caregiver / family present, sitter present    COMMUNICATION/EDUCATION:   The patient's plan of care was discussed with: physical therapist and registered nurse         Thank you for this referral.  Mike Mohr OT  Minutes: 16

## 2025-03-24 NOTE — CARE COORDINATION
Care Management Progress Note    Reason for Admission:   Dehydration [E86.0]  Confusion [R41.0]  Flu-like symptoms [R68.89]  Sepsis (HCC) [A41.9]  Sepsis, due to unspecified organism, unspecified whether acute organ dysfunction present (HCC) [A41.9]         Patient Admission Status: Inpatient  RUR:   Hospitalization in the last 30 days (Readmission):  Yes      Had arranged to train with [facility] today at 1, they will be in touch with her as discharge is no longer today and Deann with [facility] said best to train once there are orders and a PICC.  (Deann- [phone])    Transition of care plan:  Rash being assessed.  Discussed in IDR, anticipating discharge as soon as cleared by ID and orders are in, PICC is placed  [facility]   Discharge plan communicated with patient and/or discharge caregiver: Yes    Date 1st IMM letter given: 3/24  Outpatient follow-up.  Transport at discharge: family

## 2025-03-24 NOTE — PLAN OF CARE
Problem: Physical Therapy - Adult  Goal: By Discharge: Performs mobility at highest level of function for planned discharge setting.  See evaluation for individualized goals.  Description: FUNCTIONAL STATUS PRIOR TO ADMISSION: Patient was independent and active without use of DME.    HOME SUPPORT PRIOR TO ADMISSION: The patient lived with  has supportive daughter involved as well.    Physical Therapy Goals  Initiated 3/17/2025, goals remain appropriate 3/24/35:  1.  Patient will move from supine to sit and sit to supine in bed with modified independence within 7 day(s).    2.  Patient will perform sit to stand with modified independence within 7 day(s).  3.  Patient will transfer from bed to chair and chair to bed with modified independence using the least restrictive device within 7 day(s).  4.  Patient will ambulate with supervision/set-up for 200 feet with the least restrictive device within 7 day(s).   5.  Patient will ascend/descend 4 stairs with 1 handrail(s) with contact guard assist within 7 day(s).  Outcome: Progressing     PHYSICAL THERAPY TREATMENT: WEEKLY REASSESSMENT    Patient: Rusty Nevarez (79 y.o. female)  Date: 3/24/2025  Primary Diagnosis: Dehydration [E86.0]  Confusion [R41.0]  Flu-like symptoms [R68.89]  Sepsis (HCC) [A41.9]  Sepsis, due to unspecified organism, unspecified whether acute organ dysfunction present (HCC) [A41.9]       Precautions: Restrictions/Precautions  Restrictions/Precautions: Fall Risk, Bed Alarm          ASSESSMENT :  Patient continues to benefit from skilled PT services and is progressing towards goals. Patient tolerated session well. Patient is eager to participate in mobility and eager to discharge home. Patient ambulated hallways with supervision without an assistive device. Frequently turns to have conversation with therapist and no LOB noted despite fluctuations in gait speed. Patient will benefit from continued PT intervention to maximize functional  independence prior to discharge.      Patient's progression toward goals since last assessment: Progress made, continue goals. Recommend HHPT with family supervision given cognitive deficits.    Functional Outcome Measure:  The patient scored 23 on the [facility] outcome measure which is indicative of reduced odds of requiring post acute SNF/IPR upon d/c .          PLAN :  Goals have been updated based on progression since last assessment.  Patient continues to benefit from skilled intervention to address the above impairments.    Recommendations and Planned Interventions:   bed mobility training, transfer training, gait training, therapeutic exercises, patient and family training/education, and therapeutic activities      Frequency/Duration: Patient will be followed by physical therapy to address goals, PT Plan of Care: 3 times/week  per day/week to address goals.        Recommendation for discharge: (in order for the patient to meet his/her long term goals):   Intermittent physical therapy up to 2-3x/week in previous living setting     Other factors to consider for discharge: impaired cognition    IF patient discharges home will need the following DME: patient owns DME required for discharge       SUBJECTIVE:   Patient stated “The rooster would sit on my shoulder.”    OBJECTIVE DATA SUMMARY:     Past Medical History:   Diagnosis Date    Atrial fibrillation (HCC)     CKD (chronic kidney disease)     Hydrocephalus (HCC)     Hyperlipidemia     Hypertension     Mild dementia (HCC)     Prediabetes     Subarachnoid bleed (HCC)     due to aneurysm     Past Surgical History:   Procedure Laterality Date    BRAIN ANEURYSM SURGERY      CT NEEDLE BIOPSY LIVER PERCUTANEOUS  3/19/2025    CT NEEDLE BIOPSY LIVER PERCUTANEOUS 3/19/2025 [facility] RAD CT    VENTRICULOPERITONEAL SHUNT         Home Situation:  Social/Functional History  Lives With: Spouse  Type of Home: House  Home Layout: Two level  Home Access: Stairs to enter with

## 2025-03-24 NOTE — PROGRESS NOTES
[facility] Dosing Services: Antimicrobial Stewardship Daily Doc  Consult for antibiotic dosing of vanc/aztreonam by Nini Perez  Indication: bacteremia  Day of Therapy: 1 (was on vanc 3/16-3/20)    Ht Readings from Last 1 Encounters:   03/21/25 1.651 m (5' 5\")        Wt Readings from Last 1 Encounters:   03/22/25 65.8 kg (145 lb 1 oz)      Vancomycin therapy:  Loading dose: Vancomycin 1500 mg x1 dose now/given  Maintenance dose: Vancomycin 1000 mg IV every 24 hours   Dose calculated to approximate a           a. Target AUC/MALLIKA of 400-600          b. Trough of 15-20 mcg/mL   Last level:  mcg/mL  Plan: WBC 15.6k, No BMP today, afebrile. Will restart 1g q24h regimen. Scr x1 ordered for kathy.   Dose administration notes:     Non-Kinetic Antimicrobial Dosing Regimen:   Current Regimen:  Aztreonam 2g q8h  Recommendation: continue  Dose administration notes:     Other Antimicrobial   (not dosed by pharmacist) Flagyl  S/p a Few days of cefepime, Merrem, Unasyn x2 days..     Cultures 3/15: Urine: >100,000 E coli & enterococcus faecium (AmpS) - F  3/15: Blood: strep in 2/2 bottles - F  3/15 Blood: strep in both bottles - F  3/15: Blood ID PCR: strep (final, no resistance)  3/17 Blood x2: ngtd - F  3/19: body fluid: no organisms - F  3/19: cytology ab fluid:    Serum Creatinine Lab Results   Component Value Date/Time    CREATININE 0.82 03/22/2025 12:27 AM          Creatinine Clearance Estimated Creatinine Clearance: 51 mL/min (based on SCr of 0.82 mg/dL).     Temp Temp: 97.5 °F (36.4 °C) (Oral)       WBC Lab Results   Component Value Date/Time    WBC 15.6 03/24/2025 02:52 AM          Procalcitonin No results found for: \"PROCAL\"   For Antifungals, Metronidazole and Nafcillin: Lab Results   Component Value Date/Time    ALT 27 03/15/2025 06:23 PM    AST 26 03/15/2025 06:23 PM        Pharmacist: Pattie Zarate, YuriD, BCPS  [phone]

## 2025-03-24 NOTE — PROGRESS NOTES
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  Repeat blood cultures are sterile to date.  TTE negative for vegetation.  This organism is associated with endocarditis however CHANDNI not necessary as patient will receive an extended course of antibiotics for the hepatic abscess.  Linezolid unfortunately not an option, patient on Zyprexa.  Hepatic abscess.  Likely due to above.  Status post aspiration by IR 3/19.  Cultures NGSF. Will need a 6-week course of IV antibiotics  Diffuse maculopapular rash.  Suspect drug reaction, however unclear if it was due to cefepime or Unasyn therefore switched to meropenem.  Rash now more confluent still pruritic without much improvement.  Stopped meropenem and started vancomycin, Flagyl p.o. and aztreonam.  Monitor rash for improvement and then retrial 1 dose meropenem.  Recommend Benadryl, prednisone          Fever and leukocytosis.  Due to above.  WBC slowly trending down.  E. coli and Enterococcus faecium isolated from urine.  UA bland.  Significance unclear.  This will be covered by current antibiotics  CKD 3.  Monitor closely on antibiotics  History of cefazolin and erythromycin allergies.   Hydrocephalus.  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            D/w Dr. Apodaca, pt, pt' family, pharmacist, Dr Tran            Subjective:     Rash itching, has been scratching    Objective:     Vitals: BP (!) 117/55   Pulse 60   Temp 98.1 °F (36.7 °C) (Oral)   Resp 18   Ht 1.651 m (5' 5\")   Wt 65.8 kg (145 lb 1 oz)   SpO2 94%   BMI 24.14 kg/m²      Tmax:  Temp (24hrs), Av °F (36.7 °C), Min:97.5 °F (36.4 °C), Max:98.4 °F (36.9 °C)      Exam:   Patient is intubated:  no    Physical Examination:   General:  Alert, cooperative, no distress   Head:  Normocephalic, atraumatic.

## 2025-03-24 NOTE — PROGRESS NOTES
[facility]  [address] [place] [address] [phone]        Hospitalist Progress Note      NAME: Rusty Nevarez   :  1945  MRM:  [id number]    Date/Time of service: 3/24/2025  11:15 AM       Subjective:     Chief Complaint:  Patient was personally seen and examined by me during this time period.  Chart reviewed.  F/up sepsis, UTI, strep bacteremia, hepatic abscess, rash    Reports rash is worse this am and itching. No abd pain. Denies fevers or chills. No dysuria. No nausea or vomiting.       Objective:       Vitals:       Last 24hrs VS reviewed since prior progress note. Most recent are:    Vitals:    25 1012   BP: (!) 153/65   Pulse: 60   Resp:    Temp:    SpO2:      SpO2 Readings from Last 6 Encounters:   25 95%   25 99%   10/11/24 96%          Intake/Output Summary (Last 24 hours) at 3/24/2025 1115  Last data filed at 3/23/2025 1748  Gross per 24 hour   Intake 90.96 ml   Output 3 ml   Net 87.96 ml        Exam:     Physical Exam:    Gen:  in no acute distress  HEENT:  Pink conjunctivae, EOMI, hearing intact to voice, moist mucous membranes  Resp:  No accessory muscle use, clear breath sounds without wheezes rales or rhonchi  Card:  No murmurs, normal S1, S2 without thrills, bruits or peripheral edema  Abd:  Soft, non-tender, non-distended, no palpable organomegaly and no detectable hernias  Musc:  No cyanosis or clubbing  Skin:  erythematous macular rash on abdomen, back,  thighs and right arm. Somewhat warm and very itchy per patinet  Neuro: follows commands appropriately  Psych:  poor insight, oriented to person, place and time, alert. Very pleasant      Medications Reviewed: (see below)    Lab Data Reviewed: (see below)    ______________________________________________________________________    Medications:     Current Facility-Administered Medications   Medication Dose Route Frequency    sodium chloride flush 0.9 % injection 5-40 mL

## 2025-03-25 LAB
CREAT SERPL-MCNC: 1.02 MG/DL (ref 0.55–1.02)
GLUCOSE BLD STRIP.AUTO-MCNC: 118 MG/DL (ref 65–117)
GLUCOSE BLD STRIP.AUTO-MCNC: 125 MG/DL (ref 65–117)
GLUCOSE BLD STRIP.AUTO-MCNC: 200 MG/DL (ref 65–117)
GLUCOSE BLD STRIP.AUTO-MCNC: 95 MG/DL (ref 65–117)
SERVICE CMNT-IMP: ABNORMAL
SERVICE CMNT-IMP: NORMAL

## 2025-03-25 PROCEDURE — 82565 ASSAY OF CREATININE: CPT

## 2025-03-25 PROCEDURE — 6360000002 HC RX W HCPCS: Performed by: INTERNAL MEDICINE

## 2025-03-25 PROCEDURE — 6370000000 HC RX 637 (ALT 250 FOR IP): Performed by: STUDENT IN AN ORGANIZED HEALTH CARE EDUCATION/TRAINING PROGRAM

## 2025-03-25 PROCEDURE — 6370000000 HC RX 637 (ALT 250 FOR IP): Performed by: HOSPITALIST

## 2025-03-25 PROCEDURE — 6370000000 HC RX 637 (ALT 250 FOR IP): Performed by: INTERNAL MEDICINE

## 2025-03-25 PROCEDURE — 2580000003 HC RX 258

## 2025-03-25 PROCEDURE — 2500000003 HC RX 250 WO HCPCS: Performed by: STUDENT IN AN ORGANIZED HEALTH CARE EDUCATION/TRAINING PROGRAM

## 2025-03-25 PROCEDURE — 1100000000 HC RM PRIVATE

## 2025-03-25 PROCEDURE — 94761 N-INVAS EAR/PLS OXIMETRY MLT: CPT

## 2025-03-25 PROCEDURE — 82962 GLUCOSE BLOOD TEST: CPT

## 2025-03-25 PROCEDURE — 6360000002 HC RX W HCPCS

## 2025-03-25 PROCEDURE — 99232 SBSQ HOSP IP/OBS MODERATE 35: CPT

## 2025-03-25 PROCEDURE — 6370000000 HC RX 637 (ALT 250 FOR IP)

## 2025-03-25 PROCEDURE — 2500000003 HC RX 250 WO HCPCS: Performed by: HOSPITALIST

## 2025-03-25 RX ADMIN — VANCOMYCIN HYDROCHLORIDE 1000 MG: 1 INJECTION, POWDER, LYOPHILIZED, FOR SOLUTION INTRAVENOUS at 14:47

## 2025-03-25 RX ADMIN — METRONIDAZOLE 500 MG: 500 TABLET ORAL at 06:34

## 2025-03-25 RX ADMIN — Medication 1 CAPSULE: at 08:36

## 2025-03-25 RX ADMIN — AZTREONAM 2000 MG: 2 INJECTION, POWDER, LYOPHILIZED, FOR SOLUTION INTRAMUSCULAR; INTRAVENOUS at 06:34

## 2025-03-25 RX ADMIN — MIDODRINE HYDROCHLORIDE 5 MG: 5 TABLET ORAL at 16:46

## 2025-03-25 RX ADMIN — METRONIDAZOLE 500 MG: 500 TABLET ORAL at 22:33

## 2025-03-25 RX ADMIN — AZTREONAM 2000 MG: 2 INJECTION, POWDER, LYOPHILIZED, FOR SOLUTION INTRAMUSCULAR; INTRAVENOUS at 14:43

## 2025-03-25 RX ADMIN — METRONIDAZOLE 500 MG: 500 TABLET ORAL at 14:50

## 2025-03-25 RX ADMIN — MIDODRINE HYDROCHLORIDE 5 MG: 5 TABLET ORAL at 11:39

## 2025-03-25 RX ADMIN — METOPROLOL SUCCINATE 50 MG: 50 TABLET, FILM COATED, EXTENDED RELEASE ORAL at 08:36

## 2025-03-25 RX ADMIN — INSULIN LISPRO 2 UNITS: 100 INJECTION, SOLUTION INTRAVENOUS; SUBCUTANEOUS at 16:46

## 2025-03-25 RX ADMIN — CETIRIZINE HYDROCHLORIDE 5 MG: 10 TABLET, FILM COATED ORAL at 08:36

## 2025-03-25 RX ADMIN — DONEPEZIL HYDROCHLORIDE 10 MG: 5 TABLET ORAL at 22:33

## 2025-03-25 RX ADMIN — ENOXAPARIN SODIUM 40 MG: 100 INJECTION SUBCUTANEOUS at 08:39

## 2025-03-25 RX ADMIN — MIDODRINE HYDROCHLORIDE 5 MG: 5 TABLET ORAL at 08:36

## 2025-03-25 RX ADMIN — AZTREONAM 2000 MG: 2 INJECTION, POWDER, LYOPHILIZED, FOR SOLUTION INTRAMUSCULAR; INTRAVENOUS at 22:37

## 2025-03-25 RX ADMIN — ROSUVASTATIN CALCIUM 10 MG: 10 TABLET, FILM COATED ORAL at 22:33

## 2025-03-25 RX ADMIN — SODIUM CHLORIDE, PRESERVATIVE FREE 10 ML: 5 INJECTION INTRAVENOUS at 08:40

## 2025-03-25 RX ADMIN — DIPHENHYDRAMINE HYDROCHLORIDE 25 MG: 25 CAPSULE ORAL at 22:33

## 2025-03-25 RX ADMIN — SODIUM CHLORIDE, PRESERVATIVE FREE 10 ML: 5 INJECTION INTRAVENOUS at 22:11

## 2025-03-25 RX ADMIN — PREDNISONE 20 MG: 20 TABLET ORAL at 08:36

## 2025-03-25 RX ADMIN — SODIUM CHLORIDE, PRESERVATIVE FREE 10 ML: 5 INJECTION INTRAVENOUS at 22:35

## 2025-03-25 RX ADMIN — DIPHENHYDRAMINE HYDROCHLORIDE 25 MG: 25 CAPSULE ORAL at 08:36

## 2025-03-25 ASSESSMENT — PAIN SCALES - GENERAL: PAINLEVEL_OUTOF10: 0

## 2025-03-25 NOTE — PROGRESS NOTES
[facility]  [address] [place] [address] [phone]        Hospitalist Progress Note      NAME: Rusty Nevarez   :  1945  MRM:  [id number]    Date/Time of service: 3/25/2025  12:46 PM       Subjective:     Chief Complaint:  Patient was personally seen and examined by me during this time period.  Chart reviewed.  F/up sepsis, UTI, strep bacteremia, hepatic abscess, rash    Reports rash much better this am. No abd pain. Denies fevers or chills. No dysuria. No nausea or vomiting.       Objective:       Vitals:       Last 24hrs VS reviewed since prior progress note. Most recent are:    Vitals:    25 1106   BP: (!) 136/53   Pulse: 57   Resp: 18   Temp: 97.3 °F (36.3 °C)   SpO2: 98%     SpO2 Readings from Last 6 Encounters:   25 98%   25 99%   10/11/24 96%          Intake/Output Summary (Last 24 hours) at 3/25/2025 1246  Last data filed at 3/25/2025 0848  Gross per 24 hour   Intake 240 ml   Output --   Net 240 ml        Exam:     Physical Exam:    Gen:  in no acute distress  HEENT:  Pink conjunctivae, EOMI, hearing intact to voice, moist mucous membranes  Resp:  No accessory muscle use, clear breath sounds without wheezes rales or rhonchi  Card:  No murmurs, normal S1, S2 without thrills, bruits or peripheral edema  Abd:  Soft, non-tender, non-distended, no palpable organomegaly and no detectable hernias  Musc:  No cyanosis or clubbing  Skin:  erythematous macular on back still present, resolved on arm, very mild on chest. Not warm  Neuro: follows commands appropriately  Psych:  poor insight, oriented to person, place and time, alert. Very pleasant      Medications Reviewed: (see below)    Lab Data Reviewed: (see below)    ______________________________________________________________________    Medications:     Current Facility-Administered Medications   Medication Dose Route Frequency    [START ON 3/26/2025] Vanc random  0600   Other Once

## 2025-03-25 NOTE — PROGRESS NOTES
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  Repeat blood cultures are sterile to date.  TTE negative for vegetation.  This organism is associated with endocarditis however CHANDNI not necessary as patient will receive an extended course of antibiotics for the hepatic abscess.  Linezolid unfortunately not an option, patient on Zyprexa.  Hepatic abscess.  Likely due to above.  Status post aspiration by IR 3/19.  Cultures NGSF. Will need a 6-week course of IV antibiotics  Diffuse maculopapular rash.  Suspect drug reaction, however unclear if it was due to cefepime or Unasyn or meropenem.  Rash improved, less pruritic.  Continue vancomycin, Flagyl p.o. and aztreonam.  Monitor rash for improvement and then retrial 1 dose meropenem.  Recommend Benadryl, prednisone          Fever and leukocytosis.  Due to above.  WBC slowly trending down.  E. coli and Enterococcus faecium isolated from urine.  UA bland.  Significance unclear.  This will be covered by current antibiotics  CKD 3.  Monitor closely on antibiotics  History of cefazolin and erythromycin allergies.   Hydrocephalus.  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      D/w Dr. Apodaca, pt, pt' family,            Subjective:     Up in chair, rash improved, less pruritic    Objective:     Vitals: BP (!) 136/53   Pulse 57   Temp 97.3 °F (36.3 °C) (Oral)   Resp 18   Ht 1.651 m (5' 5\")   Wt 65.8 kg (145 lb 1 oz)   SpO2 98%   BMI 24.14 kg/m²      Tmax:  Temp (24hrs), Av.1 °F (36.7 °C), Min:97.3 °F (36.3 °C), Max:98.4 °F (36.9 °C)      Exam:   Patient is intubated:  no    Physical Examination:   General:  Alert, cooperative, no distress   Head:  Normocephalic, atraumatic.   Eyes:  Conjunctivae clear   Neck: Supple       Lungs:   No distress.     Chest wall:     Heart:

## 2025-03-25 NOTE — PLAN OF CARE
Problem: Safety - Adult  Goal: Free from fall injury  Outcome: Progressing     Problem: Discharge Planning  Goal: Discharge to home or other facility with appropriate resources  Outcome: Progressing       Problem: Chronic Conditions and Co-morbidities  Goal: Patient's chronic conditions and co-morbidity symptoms are monitored and maintained or improved  Outcome: Progressing

## 2025-03-25 NOTE — CARE COORDINATION
Care Management Progress Note    Reason for Admission:   Dehydration [E86.0]  Confusion [R41.0]  Flu-like symptoms [R68.89]  Sepsis (HCC) [A41.9]  Sepsis, due to unspecified organism, unspecified whether acute organ dysfunction present (HCC) [A41.9]         Patient Admission Status: Inpatient  RUR: 17%  Hospitalization in the last 30 days (Readmission):  No        Transition of care plan:  Ongoing medical management of a rash.  ID following.  Will DC on IV abx per ID note.    Discharge Plan:    A. DC on IV abx PENDING final ID recs:  Referral sent to [facility] IV infusion for review, pending final ID recs/order.  Will also need to know if patient will have a co-pay for the IV abx.  Pending PICC placement.      B.  [facility] for SN/PT/OT:  [facility]--Accepted.      Discharge plan communicated with patient and/or discharge caregiver: Yes.  :  Bhavana (DTR) [phone].     Date last IMM letter given: 3/24/2025    Outpatient follow-up:  TBD    Transport at discharge: Family    Will continue to follow.    ______________________________________  AMMY Rodriguez, RN-CM  [facility]- Care Management  Available via 1DayLater  3/25/2025  2:10 PM

## 2025-03-26 LAB
BASOPHILS # BLD: 0.08 K/UL (ref 0–0.1)
BASOPHILS NFR BLD: 0.5 % (ref 0–1)
CREAT SERPL-MCNC: 0.95 MG/DL (ref 0.55–1.02)
DIFFERENTIAL METHOD BLD: ABNORMAL
EOSINOPHIL # BLD: 0.39 K/UL (ref 0–0.4)
EOSINOPHIL NFR BLD: 2.2 % (ref 0–7)
ERYTHROCYTE [DISTWIDTH] IN BLOOD BY AUTOMATED COUNT: 16.7 % (ref 11.5–14.5)
GLUCOSE BLD STRIP.AUTO-MCNC: 127 MG/DL (ref 65–117)
GLUCOSE BLD STRIP.AUTO-MCNC: 141 MG/DL (ref 65–117)
GLUCOSE BLD STRIP.AUTO-MCNC: 194 MG/DL (ref 65–117)
GLUCOSE BLD STRIP.AUTO-MCNC: 262 MG/DL (ref 65–117)
HCT VFR BLD AUTO: 28.6 % (ref 35–47)
HGB BLD-MCNC: 9.3 G/DL (ref 11.5–16)
IMM GRANULOCYTES # BLD AUTO: 0.34 K/UL (ref 0–0.04)
IMM GRANULOCYTES NFR BLD AUTO: 1.9 % (ref 0–0.5)
LYMPHOCYTES # BLD: 3.08 K/UL (ref 0.8–3.5)
LYMPHOCYTES NFR BLD: 17.4 % (ref 12–49)
MCH RBC QN AUTO: 29.5 PG (ref 26–34)
MCHC RBC AUTO-ENTMCNC: 32.5 G/DL (ref 30–36.5)
MCV RBC AUTO: 90.8 FL (ref 80–99)
MONOCYTES # BLD: 0.76 K/UL (ref 0–1)
MONOCYTES NFR BLD: 4.3 % (ref 5–13)
NEUTS SEG # BLD: 13.04 K/UL (ref 1.8–8)
NEUTS SEG NFR BLD: 73.7 % (ref 32–75)
NRBC # BLD: 0 K/UL (ref 0–0.01)
NRBC BLD-RTO: 0 PER 100 WBC
PLATELET # BLD AUTO: 442 K/UL (ref 150–400)
PMV BLD AUTO: 10.8 FL (ref 8.9–12.9)
RBC # BLD AUTO: 3.15 M/UL (ref 3.8–5.2)
SERVICE CMNT-IMP: ABNORMAL
VANCOMYCIN SERPL-MCNC: 17.4 UG/ML
WBC # BLD AUTO: 17.7 K/UL (ref 3.6–11)

## 2025-03-26 PROCEDURE — 2500000003 HC RX 250 WO HCPCS: Performed by: STUDENT IN AN ORGANIZED HEALTH CARE EDUCATION/TRAINING PROGRAM

## 2025-03-26 PROCEDURE — 6370000000 HC RX 637 (ALT 250 FOR IP): Performed by: HOSPITALIST

## 2025-03-26 PROCEDURE — 85025 COMPLETE CBC W/AUTO DIFF WBC: CPT

## 2025-03-26 PROCEDURE — 2580000003 HC RX 258

## 2025-03-26 PROCEDURE — 2500000003 HC RX 250 WO HCPCS: Performed by: HOSPITALIST

## 2025-03-26 PROCEDURE — 6370000000 HC RX 637 (ALT 250 FOR IP): Performed by: INTERNAL MEDICINE

## 2025-03-26 PROCEDURE — 1100000000 HC RM PRIVATE

## 2025-03-26 PROCEDURE — 6360000002 HC RX W HCPCS

## 2025-03-26 PROCEDURE — 6370000000 HC RX 637 (ALT 250 FOR IP): Performed by: STUDENT IN AN ORGANIZED HEALTH CARE EDUCATION/TRAINING PROGRAM

## 2025-03-26 PROCEDURE — 99232 SBSQ HOSP IP/OBS MODERATE 35: CPT

## 2025-03-26 PROCEDURE — 6370000000 HC RX 637 (ALT 250 FOR IP)

## 2025-03-26 PROCEDURE — 97116 GAIT TRAINING THERAPY: CPT

## 2025-03-26 PROCEDURE — 82962 GLUCOSE BLOOD TEST: CPT

## 2025-03-26 PROCEDURE — 6360000002 HC RX W HCPCS: Performed by: INTERNAL MEDICINE

## 2025-03-26 PROCEDURE — 94761 N-INVAS EAR/PLS OXIMETRY MLT: CPT

## 2025-03-26 PROCEDURE — 82565 ASSAY OF CREATININE: CPT

## 2025-03-26 PROCEDURE — 97530 THERAPEUTIC ACTIVITIES: CPT

## 2025-03-26 PROCEDURE — 80202 ASSAY OF VANCOMYCIN: CPT

## 2025-03-26 PROCEDURE — 97535 SELF CARE MNGMENT TRAINING: CPT

## 2025-03-26 RX ADMIN — SODIUM CHLORIDE, PRESERVATIVE FREE 10 ML: 5 INJECTION INTRAVENOUS at 22:33

## 2025-03-26 RX ADMIN — DIPHENHYDRAMINE HYDROCHLORIDE 25 MG: 25 CAPSULE ORAL at 08:49

## 2025-03-26 RX ADMIN — INSULIN LISPRO 4 UNITS: 100 INJECTION, SOLUTION INTRAVENOUS; SUBCUTANEOUS at 16:24

## 2025-03-26 RX ADMIN — METRONIDAZOLE 500 MG: 500 TABLET ORAL at 22:33

## 2025-03-26 RX ADMIN — ROSUVASTATIN CALCIUM 10 MG: 10 TABLET, FILM COATED ORAL at 20:17

## 2025-03-26 RX ADMIN — CETIRIZINE HYDROCHLORIDE 5 MG: 10 TABLET, FILM COATED ORAL at 08:49

## 2025-03-26 RX ADMIN — METRONIDAZOLE 500 MG: 500 TABLET ORAL at 06:18

## 2025-03-26 RX ADMIN — AZTREONAM 2000 MG: 2 INJECTION, POWDER, LYOPHILIZED, FOR SOLUTION INTRAMUSCULAR; INTRAVENOUS at 06:15

## 2025-03-26 RX ADMIN — DONEPEZIL HYDROCHLORIDE 10 MG: 5 TABLET ORAL at 20:17

## 2025-03-26 RX ADMIN — AZTREONAM 2000 MG: 2 INJECTION, POWDER, LYOPHILIZED, FOR SOLUTION INTRAMUSCULAR; INTRAVENOUS at 13:48

## 2025-03-26 RX ADMIN — SODIUM CHLORIDE, PRESERVATIVE FREE 10 ML: 5 INJECTION INTRAVENOUS at 08:55

## 2025-03-26 RX ADMIN — ENOXAPARIN SODIUM 40 MG: 100 INJECTION SUBCUTANEOUS at 08:48

## 2025-03-26 RX ADMIN — METRONIDAZOLE 500 MG: 500 TABLET ORAL at 13:39

## 2025-03-26 RX ADMIN — DIPHENHYDRAMINE HYDROCHLORIDE 25 MG: 25 CAPSULE ORAL at 20:17

## 2025-03-26 RX ADMIN — PREDNISONE 20 MG: 20 TABLET ORAL at 08:48

## 2025-03-26 RX ADMIN — METOPROLOL SUCCINATE 50 MG: 50 TABLET, FILM COATED, EXTENDED RELEASE ORAL at 08:48

## 2025-03-26 RX ADMIN — ACETAMINOPHEN 650 MG: 325 TABLET ORAL at 16:24

## 2025-03-26 RX ADMIN — Medication 1 CAPSULE: at 08:48

## 2025-03-26 RX ADMIN — AZTREONAM 2000 MG: 2 INJECTION, POWDER, LYOPHILIZED, FOR SOLUTION INTRAMUSCULAR; INTRAVENOUS at 22:36

## 2025-03-26 RX ADMIN — INSULIN LISPRO 2 UNITS: 100 INJECTION, SOLUTION INTRAVENOUS; SUBCUTANEOUS at 20:22

## 2025-03-26 RX ADMIN — VANCOMYCIN HYDROCHLORIDE 1000 MG: 1 INJECTION, POWDER, LYOPHILIZED, FOR SOLUTION INTRAVENOUS at 12:47

## 2025-03-26 NOTE — PROGRESS NOTES
[facility] Dosing Services: Antimicrobial Stewardship Daily Doc  Consult for antibiotic dosing of vanc/aztreonam by Nini Perez  Indication: bacteremia  Day of Therapy: 3 (was on vanc 3/16-3/20)  ID consult    Ht Readings from Last 1 Encounters:   03/21/25 1.651 m (5' 5\")        Wt Readings from Last 1 Encounters:   03/22/25 65.8 kg (145 lb 1 oz)      Vancomycin therapy:  Loading dose: Vancomycin 1500 mg x1 dose now/given  Maintenance dose: Vancomycin 1000 mg IV every 24 hours   Dose calculated to approximate a           a. Target AUC/MALLIKA of 400-600          b. Trough of 15-20 mcg/mL   Last level:  17.4 mcg/mL ~13h after the dose - tx auc on 3/26  Plan: WBC 17.7k, Scr 0.95, afebrile. Will continue 1g q24h regimen (~auc  487).   Dose administration notes:     Non-Kinetic Antimicrobial Dosing Regimen:   Current Regimen:  Aztreonam 2g q8h  Recommendation: continue  Dose administration notes:     Other Antimicrobial   (not dosed by pharmacist) Flagyl  S/p a Few days of cefepime, Merrem, Unasyn x2 days..     Cultures 3/15: Urine: >100,000 E coli & enterococcus faecium (AmpS) - F  3/15: Blood: strep in 2/2 bottles - F  3/15 Blood: strep in both bottles - F  3/15: Blood ID PCR: strep (final, no resistance)  3/17 Blood x2: ngtd - F  3/19: body fluid: no organisms - F  3/19: cytology ab fluid:    Serum Creatinine Lab Results   Component Value Date/Time    CREATININE 0.95 03/26/2025 03:48 AM          Creatinine Clearance Estimated Creatinine Clearance: 44 mL/min (based on SCr of 0.95 mg/dL).     Temp Temp: 98.2 °F (36.8 °C) (Axillary)       WBC Lab Results   Component Value Date/Time    WBC 17.7 03/26/2025 03:48 AM          Procalcitonin No results found for: \"PROCAL\"   For Antifungals, Metronidazole and Nafcillin: Lab Results   Component Value Date/Time    ALT 27 03/15/2025 06:23 PM    AST 26 03/15/2025 06:23 PM        Pharmacist: Yuri CasillasD, BCPS  [phone]

## 2025-03-26 NOTE — PLAN OF CARE
Problem: Chronic Conditions and Co-morbidities  Goal: Patient's chronic conditions and co-morbidity symptoms are monitored and maintained or improved  3/26/2025 0945 by Fabiola Olivera RN  Outcome: Progressing     Problem: Safety - Adult  Goal: Free from fall injury  3/26/2025 0945 by Fabiola Olivera RN  Outcome: Progressing     Problem: Respiratory - Adult  Goal: Achieves optimal ventilation and oxygenation  3/26/2025 0945 by Fabiola Olivera RN  Outcome: Progressing     Problem: Discharge Planning  Goal: Discharge to home or other facility with appropriate resources  Outcome: Progressing     Problem: Skin/Tissue Integrity  Goal: Skin integrity remains intact  Description: 1.  Monitor for areas of redness and/or skin breakdown  2.  Assess vascular access sites hourly  3.  Every 4-6 hours minimum:  Change oxygen saturation probe site  4.  Every 4-6 hours:  If on nasal continuous positive airway pressure, respiratory therapy assess nares and determine need for appliance change or resting period  Outcome: Progressing  Flowsheets (Taken 3/25/2025 2237 by Maximilian Ryan RN)  Skin Integrity Remains Intact: Monitor for areas of redness and/or skin breakdown

## 2025-03-26 NOTE — PROGRESS NOTES
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  Repeat blood cultures are sterile to date.  TTE negative for vegetation.  This organism is associated with endocarditis however CHANDNI not necessary as patient will receive an extended course of antibiotics for the hepatic abscess.  Linezolid unfortunately not an option, patient on Zyprexa.  Hepatic abscess.  Likely due to above.  Status post aspiration by IR 3/19.  Cultures NGSF. Will need a 6-week course of IV antibiotics  Diffuse maculopapular rash.  Suspect drug reaction, however unclear if it was due to cefepime or Unasyn or meropenem..  Continue vancomycin, Flagyl p.o. and aztreonam. Rash less intense today, possible trial of meropenem tomorrow.  .  Recommend Benadryl, prednisone          Fever and leukocytosis.  Due to above.  WBC slowly trending down.  E. coli and Enterococcus faecium isolated from urine.  UA bland.  Significance unclear.  This will be covered by current antibiotics  CKD 3.  Monitor closely on antibiotics  History of cefazolin and erythromycin allergies.   Hydrocephalus.  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      D/w Dr. Apodaca, pt, pt' family,            Subjective:     Up in chair, rash less intense, no pruritus    Objective:     Vitals: BP (!) 170/64 Comment: RN Notified  Pulse 66   Temp 97.3 °F (36.3 °C) (Oral)   Resp 16   Ht 1.651 m (5' 5\")   Wt 65.8 kg (145 lb 1 oz)   SpO2 100%   BMI 24.14 kg/m²      Tmax:  Temp (24hrs), Av °F (36.7 °C), Min:97.3 °F (36.3 °C), Max:98.2 °F (36.8 °C)      Exam:   Patient is intubated:  no    Physical Examination:   General:  Alert, cooperative, no distress   Head:  Normocephalic, atraumatic.   Eyes:  Conjunctivae clear   Neck: Supple       Lungs:   No distress.     Chest wall:     Heart:

## 2025-03-26 NOTE — PLAN OF CARE
Problem: Occupational Therapy - Adult  Goal: By Discharge: Performs self-care activities at highest level of function for planned discharge setting.  See evaluation for individualized goals.  Description: FUNCTIONAL STATUS PRIOR TO ADMISSION:  Patient is normally independent with ADLs and mobility without use of AD/DME.  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  No home O2 use.     HOME SUPPORT: Patient lived with her . Has a very supportive daughter who lives locally as well.    Occupational Therapy Goals:  Initiated 3/17/2025, continued 3/24/2025  1.  Patient will perform grooming, standing at sink, with Modified [place] within 7 day(s).  2.  Patient will perform lower body dressing with Supervision within 7 day(s).  3.  Patient will perform bathing from neck to knees with Supervision within 7 day(s).  4.  Patient will perform toilet transfers with Supervision  within 7 day(s).  5.  Patient will perform all aspects of toileting with Supervision within 7 day(s).  6.  Patient will participate in upper extremity therapeutic exercise/activities with Supervision for 10 minutes within 7 day(s).    7.  Patient will utilize energy conservation techniques during functional activities with verbal cues within 7 day(s).  Outcome: Progressing   OCCUPATIONAL THERAPY TREATMENT  Patient: Rusty Nevarez (79 y.o. female)  Date: 3/26/2025  Primary Diagnosis: Dehydration [E86.0]  Confusion [R41.0]  Flu-like symptoms [R68.89]  Sepsis (HCC) [A41.9]  Sepsis, due to unspecified organism, unspecified whether acute organ dysfunction present (HCC) [A41.9]       Precautions: Fall Risk, Bed Alarm                Chart, occupational therapy assessment, plan of care, and goals were reviewed.    ASSESSMENT  Patient continues to benefit from skilled OT services and is progressing towards goals. Pt pleasantly confused. She was seated in chair, family present. Educated them as  to foam long handle to place on utensils as spouse stated she does not seem to have the coordination like she did at home to feed herself.  Pt ambulated to restroom and used commode with contact guard, she ambulated to sink and washed her hands stand by assist. Pt returned to sit in chair.              PLAN :  Patient continues to benefit from skilled intervention to address the above impairments.  Continue treatment per established plan of care to address goals.    Recommend with staff: ACTIVITIES OF DAILY LIVING's, there ex, there act    Recommend next OT session: cont towards goals    Recommendation for discharge: (in order for the patient to meet his/her long term goals):   Intermittent occupational therapy up to 2-3x/week in previous living setting    Other factors to consider for discharge: no additional factors    IF patient discharges home will need the following DME:        SUBJECTIVE:   Patient stated “I am from [place].”    OBJECTIVE DATA SUMMARY:   Cognitive/Behavioral Status:  Orientation  Overall Orientation Status: Impaired  Orientation Level: Oriented to person  Cognition  Overall Cognitive Status: Exceptions  Arousal/Alertness: Appears intact  Following Commands: Follows one step commands consistently  Attention Span: Attends with cues to redirect;Impaired  Memory: Impaired;Decreased short term memory  Safety Judgement: Impaired;Decreased awareness of need for assistance  Problem Solving: Impaired;Assistance required to implement solutions  Insights: Decreased awareness of deficits  Initiation: Requires cues for some  Sequencing: Requires cues for some    Functional Mobility and Transfers for ADLs:  Bed Mobility:  Bed Mobility Training  Bed Mobility Training: No     Transfers:   Transfer Training  Overall Level of Assistance: Contact guard assistance  Interventions: Safety awareness training  Sit to Stand: Independent  Stand to Sit: Independent  Toilet Transfer: Contact guard assistance (safety

## 2025-03-26 NOTE — CARE COORDINATION
Care Management Progress Note    Reason for Admission:   Dehydration [E86.0]  Confusion [R41.0]  Flu-like symptoms [R68.89]  Sepsis (HCC) [A41.9]  Sepsis, due to unspecified organism, unspecified whether acute organ dysfunction present (HCC) [A41.9]         Patient Admission Status: Inpatient  RUR: 17%  Hospitalization in the last 30 days (Readmission):  No        Transition of care plan:  Ongoing medical management.  Pending Final Recs from ID.  Pending PICC placement.      Discharge Plan:    \A. DC on IV abx PENDING final ID recs:  Referral sent to [facility] IV infusion for review, pending final ID recs/order.  Will also need to know if patient will have a co-pay for the IV abx.  Pending PICC placement. Pending bedside teaching.      B.  [facility] Services for SN/PT/OT:  [facility]--Accepted.        Discharge plan communicated with patient and/or discharge caregiver: Yes.  CM met with patient, patient's spouse, Chandra and patient's stephanrRosemary at bedside to discuss DCP updates.  Patient's spouse is requesting to speak to Dr. Apodaca, not Nini Perez NP.  They requested to speak to an \"ID MD\" not an \"ID NP.\"  CM informed them that Dr. Apodaca may not be here today, but will check.  CM informed Attending of updates.      Another :  Bhavana (DTR) [phone].     Date last IMM letter given: 3/24/2025    Outpatient follow-up:  TBD    Transport at discharge: Family      Will continue to follow.      ______________________________________  AMMY Rodriguez, RN-CM  [facility]- Care Management  Available via [employer]  3/26/2025  1:46 PM

## 2025-03-26 NOTE — PROGRESS NOTES
[facility]  [address] [place] [address] [phone]        Hospitalist Progress Note      NAME: Rusty Nevarez   :  1945  MRM:  [id number]    Date/Time of service: 3/26/2025  11:42 AM       Subjective:     Chief Complaint:  Patient was personally seen and examined by me during this time period.  Chart reviewed.  F/up sepsis, UTI, strep bacteremia, hepatic abscess, rash    Rash continues to improve. Wants to go home.    Family updated at bedside.       Objective:       Vitals:       Last 24hrs VS reviewed since prior progress note. Most recent are:    Vitals:    25 0659   BP: (!) 153/56   Pulse: 95   Resp: 16   Temp: 98.2 °F (36.8 °C)   SpO2: 95%     SpO2 Readings from Last 6 Encounters:   25 95%   25 99%   10/11/24 96%          Intake/Output Summary (Last 24 hours) at 3/26/2025 1142  Last data filed at 3/26/2025 0824  Gross per 24 hour   Intake 186 ml   Output --   Net 186 ml        Exam:     Physical Exam:    Gen:  in no acute distress  HEENT:  Pink conjunctivae, EOMI, hearing intact to voice, moist mucous membranes  Resp:  No accessory muscle use, clear breath sounds without wheezes rales or rhonchi  Card:  No murmurs, normal S1, S2 without thrills, bruits or peripheral edema  Abd:  Soft, non-tender, non-distended, no palpable organomegaly and no detectable hernias  Musc:  No cyanosis or clubbing  Skin:  erythematous macular rash on lower back resolving, almost resolved on arm and resolved on chest. Not warm  Neuro: follows commands appropriately  Psych:  poor insight, oriented to person, place and time, alert. Very pleasant      Medications Reviewed: (see below)    Lab Data Reviewed: (see below)    ______________________________________________________________________    Medications:     Current Facility-Administered Medications   Medication Dose Route Frequency    Vanc random  0600   Other Once    sodium chloride flush 0.9 %

## 2025-03-26 NOTE — PROGRESS NOTES
Comprehensive Nutrition Assessment    Type and Reason for Visit:  Reassess    Nutrition Recommendations/Plan:   Continue regular, 4 carb choice diet order     Malnutrition Assessment:  Malnutrition Status:  No malnutrition (03/26/25 1228)    Context:  Acute Illness     Findings of the 6 clinical characteristics of malnutrition:  Energy Intake:  No decrease in energy intake  Weight Loss:  No weight loss     Body Fat Loss:  No body fat loss     Muscle Mass Loss:  No muscle mass loss    Fluid Accumulation:  No fluid accumulation     Strength:  Not Performed    Nutrition Assessment:     3/26: Follow up. Great PO intake throughout admission. No chewing/swallowing issues. No reported nausea/vomiting/diarrhea. No labs x 4 days. Edema has improved from prior RD encounter. No complaints at this time; per chart review, awaiting final recs for abx. New bedscale weight noted, ~15# elevated from admission/stated weight.    Last Weight Metrics:      3/26/2025    12:26 PM 3/22/2025     1:55 AM 3/21/2025     9:07 PM 3/21/2025    12:13 PM 3/16/2025    12:51 PM 3/15/2025     5:49 PM 2/14/2025     2:51 PM   Weight Loss Metrics   Height 5' 5\"   5' 5\" 5' 5\" 5' 5\" 5' 5\"   Weight - Scale  145 lbs 1 oz 145 lbs 1 oz  130 lbs 130 lbs 125 lbs   BMI (Calculated)  24.2 kg/m2 24.2 kg/m2  21.7 kg/m2 21.7 kg/m2 20.8 kg/m2       Meal Intake:   Patient Vitals for the past 168 hrs:   PO Meals Eaten (%)   03/26/25 0824 26 - 50%   03/25/25 0848 76 - 100%   03/24/25 1217 76 - 100%   03/23/25 1233 51 - 75%   03/22/25 1648 76 - 100%   03/22/25 1335 76 - 100%   03/22/25 0844 51 - 75%   03/21/25 1726 76 - 100%   03/21/25 1240 51 - 75%   03/21/25 0831 26 - 50%   03/20/25 1123 76 - 100%     Supplement Intake:  No data found.    Nutrition Related Findings:      Wound Type: None     Last BM: 03/25/25  Edema: Right lower extremity, Left lower extremity            RLE Edema: Trace  LLE Edema: +1, Non-pitting    Nutr. Labs:    Lab Results   Component Value

## 2025-03-26 NOTE — PROGRESS NOTES
Physical Therapy    Chart reviewed, pt cleared by RN. Upon attempt, OT exiting room and reports pt deferring until after lunch. Will check back as able and appropriate.    OLIVER GRULLON, PT

## 2025-03-26 NOTE — PLAN OF CARE
Problem: Physical Therapy - Adult  Goal: By Discharge: Performs mobility at highest level of function for planned discharge setting.  See evaluation for individualized goals.  Description: FUNCTIONAL STATUS PRIOR TO ADMISSION: Patient was independent and active without use of DME.    HOME SUPPORT PRIOR TO ADMISSION: The patient lived with  has supportive daughter involved as well.    Physical Therapy Goals  Initiated 3/17/2025, goals remain appropriate 3/24/35:  1.  Patient will move from supine to sit and sit to supine in bed with modified independence within 7 day(s).    2.  Patient will perform sit to stand with modified independence within 7 day(s).  3.  Patient will transfer from bed to chair and chair to bed with modified independence using the least restrictive device within 7 day(s).  4.  Patient will ambulate with supervision/set-up for 200 feet with the least restrictive device within 7 day(s).   5.  Patient will ascend/descend 4 stairs with 1 handrail(s) with contact guard assist within 7 day(s).  Outcome: Progressing   PHYSICAL THERAPY TREATMENT    Patient: Rusty Nevarez (79 y.o. female)  Date: 3/26/2025  Diagnosis: Dehydration [E86.0]  Confusion [R41.0]  Flu-like symptoms [R68.89]  Sepsis (HCC) [A41.9]  Sepsis, due to unspecified organism, unspecified whether acute organ dysfunction present (HCC) [A41.9] Bacteremia due to Streptococcus      Precautions: Restrictions/Precautions  Restrictions/Precautions: Fall Risk, Bed Alarm            ASSESSMENT:  Patient continues to benefit from skilled PT services and is progressing towards goals. Pt is pleasantly confused and repetitive during session. She is agreeable to transfer training, gait training, family education, ADLs, and 5x sit to stand test with increased time indicating elevated fall risk/LE weakness ( expressed concerns with pt's transfers), etc... Pt will benefit from further sit<>stand strengthening/power training for safety  head turns and distractions)   Ambulation/Gait Training:     Gait  Gait Training: Yes  Overall Level of Assistance: Stand by assistance;Contact guard assistance  Distance (ft): 350 Feet  Assistive Device: Gait belt;Other (comment) (IV pole support (good) and then UE support removed and pt with 1-2 LOB w/ head turns/distractions and stepping mechanism for recovery)  Interventions: Safety awareness training;Verbal cues  Base of Support: Center of gravity altered;Narrowed  Speed/Romelia: Fluctuations (cues to slow)  Step Length: Left shortened;Right shortened  Gait Abnormalities: Path deviations  Stairs - Level of Assistance:  (Unable to assess 2/2 construction)    Five Times Sit to Stand:    5 TIMES SIT TO STAND: 23.43 seconds         Normative averages:  Clients younger than 60 years old  £  10 seconds = Normal   Clients older than 60 years old £ 14.2 seconds = Normal   Change of ³ 2.3 seconds shows a significant clinical improvement    Sanaz RW. Reference values for the five repetition sit to stand test: a descriptive metaanalysis of data from elders. Percept Mot Skills 2006; 103(1):215-222.       Pain Rating:  NA    Activity Tolerance:   Good, tolerates ADLS without rest breaks, and NAD    After treatment:   Patient left in no apparent distress sitting up in chair, Call bell within reach, and Caregiver / family present      COMMUNICATION/EDUCATION:   The patient's plan of care was discussed with: occupational therapist and registered nurse    Patient Education  Education Given To: Patient  Education Provided: Role of Therapy;Plan of Care;Fall Prevention Strategies  Education Provided Comments: calling for assistance to mobilize 2/2 IV/fall risk; informed family to notify RN is leaving for alarm activation  Education Method: Verbal  Barriers to Learning: None;Cognition  Education Outcome: Verbalized understanding;Continued education needed      OLIVER GRULLON, PT  Minutes: 27

## 2025-03-27 LAB
BASOPHILS # BLD: 0.08 K/UL (ref 0–0.1)
BASOPHILS NFR BLD: 0.5 % (ref 0–1)
DIFFERENTIAL METHOD BLD: ABNORMAL
EOSINOPHIL # BLD: 0.34 K/UL (ref 0–0.4)
EOSINOPHIL NFR BLD: 2.1 % (ref 0–7)
ERYTHROCYTE [DISTWIDTH] IN BLOOD BY AUTOMATED COUNT: 17.2 % (ref 11.5–14.5)
GLUCOSE BLD STRIP.AUTO-MCNC: 127 MG/DL (ref 65–117)
GLUCOSE BLD STRIP.AUTO-MCNC: 132 MG/DL (ref 65–117)
GLUCOSE BLD STRIP.AUTO-MCNC: 209 MG/DL (ref 65–117)
GLUCOSE BLD STRIP.AUTO-MCNC: 89 MG/DL (ref 65–117)
HCT VFR BLD AUTO: 29.6 % (ref 35–47)
HGB BLD-MCNC: 9.5 G/DL (ref 11.5–16)
IMM GRANULOCYTES # BLD AUTO: 0.26 K/UL (ref 0–0.04)
IMM GRANULOCYTES NFR BLD AUTO: 1.6 % (ref 0–0.5)
LYMPHOCYTES # BLD: 3.72 K/UL (ref 0.8–3.5)
LYMPHOCYTES NFR BLD: 22.8 % (ref 12–49)
MCH RBC QN AUTO: 29.2 PG (ref 26–34)
MCHC RBC AUTO-ENTMCNC: 32.1 G/DL (ref 30–36.5)
MCV RBC AUTO: 91.1 FL (ref 80–99)
MONOCYTES # BLD: 0.77 K/UL (ref 0–1)
MONOCYTES NFR BLD: 4.7 % (ref 5–13)
NEUTS SEG # BLD: 11.15 K/UL (ref 1.8–8)
NEUTS SEG NFR BLD: 68.3 % (ref 32–75)
NRBC # BLD: 0 K/UL (ref 0–0.01)
NRBC BLD-RTO: 0 PER 100 WBC
PLATELET # BLD AUTO: 446 K/UL (ref 150–400)
PMV BLD AUTO: 10.5 FL (ref 8.9–12.9)
RBC # BLD AUTO: 3.25 M/UL (ref 3.8–5.2)
SERVICE CMNT-IMP: ABNORMAL
SERVICE CMNT-IMP: NORMAL
WBC # BLD AUTO: 16.3 K/UL (ref 3.6–11)

## 2025-03-27 PROCEDURE — 97530 THERAPEUTIC ACTIVITIES: CPT

## 2025-03-27 PROCEDURE — 82962 GLUCOSE BLOOD TEST: CPT

## 2025-03-27 PROCEDURE — 6370000000 HC RX 637 (ALT 250 FOR IP)

## 2025-03-27 PROCEDURE — 6370000000 HC RX 637 (ALT 250 FOR IP): Performed by: STUDENT IN AN ORGANIZED HEALTH CARE EDUCATION/TRAINING PROGRAM

## 2025-03-27 PROCEDURE — 6360000002 HC RX W HCPCS

## 2025-03-27 PROCEDURE — 2500000003 HC RX 250 WO HCPCS: Performed by: STUDENT IN AN ORGANIZED HEALTH CARE EDUCATION/TRAINING PROGRAM

## 2025-03-27 PROCEDURE — 1100000000 HC RM PRIVATE

## 2025-03-27 PROCEDURE — 6360000002 HC RX W HCPCS: Performed by: INTERNAL MEDICINE

## 2025-03-27 PROCEDURE — 85025 COMPLETE CBC W/AUTO DIFF WBC: CPT

## 2025-03-27 PROCEDURE — 6370000000 HC RX 637 (ALT 250 FOR IP): Performed by: INTERNAL MEDICINE

## 2025-03-27 PROCEDURE — 2500000003 HC RX 250 WO HCPCS: Performed by: HOSPITALIST

## 2025-03-27 PROCEDURE — 99233 SBSQ HOSP IP/OBS HIGH 50: CPT

## 2025-03-27 PROCEDURE — 94761 N-INVAS EAR/PLS OXIMETRY MLT: CPT

## 2025-03-27 PROCEDURE — 6370000000 HC RX 637 (ALT 250 FOR IP): Performed by: HOSPITALIST

## 2025-03-27 PROCEDURE — 2580000003 HC RX 258

## 2025-03-27 RX ORDER — DIPHENHYDRAMINE HYDROCHLORIDE 50 MG/ML
25 INJECTION, SOLUTION INTRAMUSCULAR; INTRAVENOUS
Status: DISCONTINUED | OUTPATIENT
Start: 2025-03-27 | End: 2025-03-28 | Stop reason: HOSPADM

## 2025-03-27 RX ADMIN — SODIUM CHLORIDE, PRESERVATIVE FREE 5 ML: 5 INJECTION INTRAVENOUS at 09:23

## 2025-03-27 RX ADMIN — ROSUVASTATIN CALCIUM 10 MG: 10 TABLET, FILM COATED ORAL at 19:54

## 2025-03-27 RX ADMIN — MEROPENEM 1000 MG: 1 INJECTION INTRAVENOUS at 22:27

## 2025-03-27 RX ADMIN — DIPHENHYDRAMINE HYDROCHLORIDE 25 MG: 25 CAPSULE ORAL at 19:54

## 2025-03-27 RX ADMIN — CETIRIZINE HYDROCHLORIDE 5 MG: 10 TABLET, FILM COATED ORAL at 09:23

## 2025-03-27 RX ADMIN — Medication 1 CAPSULE: at 09:23

## 2025-03-27 RX ADMIN — SODIUM CHLORIDE, PRESERVATIVE FREE 10 ML: 5 INJECTION INTRAVENOUS at 19:57

## 2025-03-27 RX ADMIN — PREDNISONE 20 MG: 20 TABLET ORAL at 09:23

## 2025-03-27 RX ADMIN — AZTREONAM 2000 MG: 2 INJECTION, POWDER, LYOPHILIZED, FOR SOLUTION INTRAMUSCULAR; INTRAVENOUS at 06:03

## 2025-03-27 RX ADMIN — ENOXAPARIN SODIUM 40 MG: 100 INJECTION SUBCUTANEOUS at 09:22

## 2025-03-27 RX ADMIN — MEROPENEM 1000 MG: 1 INJECTION INTRAVENOUS at 13:00

## 2025-03-27 RX ADMIN — METOPROLOL SUCCINATE 50 MG: 50 TABLET, FILM COATED, EXTENDED RELEASE ORAL at 09:42

## 2025-03-27 RX ADMIN — METRONIDAZOLE 500 MG: 500 TABLET ORAL at 05:57

## 2025-03-27 RX ADMIN — DIPHENHYDRAMINE HYDROCHLORIDE 25 MG: 25 CAPSULE ORAL at 09:23

## 2025-03-27 RX ADMIN — MIDODRINE HYDROCHLORIDE 5 MG: 5 TABLET ORAL at 09:42

## 2025-03-27 RX ADMIN — INSULIN LISPRO 2 UNITS: 100 INJECTION, SOLUTION INTRAVENOUS; SUBCUTANEOUS at 20:00

## 2025-03-27 RX ADMIN — DONEPEZIL HYDROCHLORIDE 10 MG: 5 TABLET ORAL at 19:54

## 2025-03-27 NOTE — PROGRESS NOTES
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  Repeat blood cultures are sterile to date.  TTE negative for vegetation.  This organism is associated with endocarditis however CHANDNI not necessary as patient will receive an extended course of antibiotics for the hepatic abscess.  Linezolid unfortunately not an option, patient on Zyprexa.  Hepatic abscess.  Likely due to above.  Status post aspiration by IR 3/19.  Cultures NGSF. Will need a 6-week course of IV antibiotics  Diffuse maculopapular rash.  Suspect drug reaction, however unclear if it was due to cefepime or Unasyn or meropenem..  Rash much improved today.  Trial of meropenem today.  Monitor for worsening of rash and itching.  If unable to tolerate continue vancomycin, Flagyl p.o. and aztreonam.  If she tolerates meropenem, stop vancomycin, Flagyl, aztreonam and restart meropenem.           Fever and leukocytosis.  Due to above.  WBC slowly trending down.  E. coli and Enterococcus faecium isolated from urine.  UA bland.  Significance unclear.  This will be covered by current antibiotics  CKD 3.  Monitor closely on antibiotics  History of cefazolin and erythromycin allergies.   Hydrocephalus.  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      D/w Dr. Apodaca, pt, pt' family, nursing, pharmacist Dr. Tran            Subjective:     Up in chair, rash much improved.  Discussed plan of travel of meropenem with nursing as well as pharmacist.  Benadryl, Pepcid and Solu-Medrol in place for allergic reaction.    Objective:     Vitals: BP (!) 165/66   Pulse 72   Temp 97.9 °F (36.6 °C) (Oral)   Resp 14   Ht 1.651 m (5' 5\")   Wt 65.8 kg (145 lb 1 oz)   SpO2 93%   BMI 24.14 kg/m²      Tmax:  Temp (24hrs), Av.4 °F (36.9 °C), Min:97.9 °F (36.6 °C), Max:99 °F (37.2

## 2025-03-27 NOTE — CARE COORDINATION
Care Management Progress Note    Reason for Admission:   Dehydration [E86.0]  Confusion [R41.0]  Flu-like symptoms [R68.89]  Sepsis (HCC) [A41.9]  Sepsis, due to unspecified organism, unspecified whether acute organ dysfunction present (HCC) [A41.9]         Patient Admission Status: Inpatient  RUR: 17%  Hospitalization in the last 30 days (Readmission):  No        Transition of care plan:  Ongoing medical management.  Plan to start Meropenem today.  PENDING PICC Placement.  Pending final recs from ID.      Discharge Plan:  Anticipate DC to home on IV abx.  Pending final ID recs.  Pending PICC.     A. DC on IV abx PENDING final ID recs:  Referral sent to [facility] IV infusion for review, pending final ID recs/order.  Will also need to know if patient will have a co-pay for the IV abx.  Pending PICC placement. Pending bedside teaching.       B.  [facility] Services for SN/PT/OT:  [facility]--Accepted.      Discharge plan communicated with patient and/or discharge caregiver: Yes      Date last IMM letter given: 3/24/2025    Outpatient follow-up:  TBD    Transport at discharge: Family      ______________________________________  AMMY Rodriguez, RN-CM  [facility]- Care Management  Available via [employer]  3/27/2025  12:24 PM

## 2025-03-27 NOTE — PLAN OF CARE
Problem: Occupational Therapy - Adult  Goal: By Discharge: Performs self-care activities at highest level of function for planned discharge setting.  See evaluation for individualized goals.  Description: FUNCTIONAL STATUS PRIOR TO ADMISSION:  Patient is normally independent with ADLs and mobility without use of AD/DME.  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  No home O2 use.     HOME SUPPORT: Patient lived with her . Has a very supportive daughter who lives locally as well.    Occupational Therapy Goals:  Initiated 3/17/2025, continued 3/24/2025  1.  Patient will perform grooming, standing at sink, with Modified [place] within 7 day(s).  2.  Patient will perform lower body dressing with Supervision within 7 day(s).  3.  Patient will perform bathing from neck to knees with Supervision within 7 day(s).  4.  Patient will perform toilet transfers with Supervision  within 7 day(s).  5.  Patient will perform all aspects of toileting with Supervision within 7 day(s).  6.  Patient will participate in upper extremity therapeutic exercise/activities with Supervision for 10 minutes within 7 day(s).    7.  Patient will utilize energy conservation techniques during functional activities with verbal cues within 7 day(s).  Outcome: Adequate for Discharge      OCCUPATIONAL THERAPY TREATMENT/DISCHARGE  Patient: Rusty Nevarez (79 y.o. female)  Date: 3/27/2025  Primary Diagnosis: Dehydration [E86.0]  Confusion [R41.0]  Flu-like symptoms [R68.89]  Sepsis (HCC) [A41.9]  Sepsis, due to unspecified organism, unspecified whether acute organ dysfunction present (HCC) [A41.9]       Precautions: Fall Risk, Bed Alarm                  Chart, occupational therapy assessment, plan of care, and goals were reviewed.    ASSESSMENT  Patient continues with skilled OT services and has progressed towards goals.  Per family and sitter at bedside, patient has been very active

## 2025-03-27 NOTE — PROGRESS NOTES
[facility]  [address] [place] [address] [phone]        Hospitalist Progress Note      NAME: Rusty Nevarez   :  1945  MRM:  [id number]    Date/Time of service: 3/27/2025  10:23 AM       Subjective:     Chief Complaint:  Patient was personally seen and examined by me during this time period.  Chart reviewed.  F/up sepsis, UTI, strep bacteremia, hepatic abscess, rash      No itching. Feels fine and once again states she wants to go home    Family updated at bedside.       Objective:       Vitals:       Last 24hrs VS reviewed since prior progress note. Most recent are:    Vitals:    25 0915   BP: (!) 125/53   Pulse: 69   Resp:    Temp:    SpO2:      SpO2 Readings from Last 6 Encounters:   25 97%   25 99%   10/11/24 96%          Intake/Output Summary (Last 24 hours) at 3/27/2025 1023  Last data filed at 3/27/2025 0902  Gross per 24 hour   Intake 1616 ml   Output --   Net 1616 ml        Exam:     Physical Exam:    Gen:  in no acute distress  HEENT:  Pink conjunctivae, EOMI, hearing intact to voice, moist mucous membranes  Resp:  No accessory muscle use, clear breath sounds without wheezes rales or rhonchi  Card:  No murmurs, normal S1, S2 without thrills, bruits or peripheral edema  Abd:  Soft, non-tender, non-distended, no palpable organomegaly and no detectable hernias  Musc:  No cyanosis or clubbing  Skin:  erythematous macular rash Rash improved everywhere except right arm where it looks a little worse  Neuro: follows commands appropriately  Psych:  poor insight, oriented to person, place and time, alert. Very pleasant      Medications Reviewed: (see below)    Lab Data Reviewed: (see below)    ______________________________________________________________________    Medications:     Current Facility-Administered Medications   Medication Dose Route Frequency    Vanc random  0600   Other Once    sodium chloride flush 0.9 % injection

## 2025-03-27 NOTE — PROGRESS NOTES
Infectious Disease Services Note    Informed by nursing that patient tolerated meropenem. Stop vancomycin, aztreonam, flagyl. Continue meropenem.            Nini Perez APRN-NP

## 2025-03-27 NOTE — PLAN OF CARE
Problem: Chronic Conditions and Co-morbidities  Goal: Patient's chronic conditions and co-morbidity symptoms are monitored and maintained or improved  Outcome: Progressing     Problem: Safety - Adult  Goal: Free from fall injury  3/27/2025 1026 by Fabiola Olivera RN  Outcome: Progressing     Problem: Respiratory - Adult  Goal: Achieves optimal ventilation and oxygenation  Outcome: Progressing     Problem: Discharge Planning  Goal: Discharge to home or other facility with appropriate resources  3/27/2025 1026 by Fabiola Olivera RN  Outcome: Progressing     Problem: Skin/Tissue Integrity  Goal: Skin integrity remains intact  Description: 1.  Monitor for areas of redness and/or skin breakdown  2.  Assess vascular access sites hourly  3.  Every 4-6 hours minimum:  Change oxygen saturation probe site  4.  Every 4-6 hours:  If on nasal continuous positive airway pressure, respiratory therapy assess nares and determine need for appliance change or resting period  3/27/2025 1026 by Fabiola Olivera RN  Outcome: Progressing

## 2025-03-28 VITALS
HEART RATE: 66 BPM | BODY MASS INDEX: 24.17 KG/M2 | SYSTOLIC BLOOD PRESSURE: 121 MMHG | RESPIRATION RATE: 17 BRPM | DIASTOLIC BLOOD PRESSURE: 51 MMHG | HEIGHT: 65 IN | TEMPERATURE: 98.2 F | OXYGEN SATURATION: 100 % | WEIGHT: 145.06 LBS

## 2025-03-28 LAB
CREAT SERPL-MCNC: 0.88 MG/DL (ref 0.55–1.02)
GLUCOSE BLD STRIP.AUTO-MCNC: 111 MG/DL (ref 65–117)
GLUCOSE BLD STRIP.AUTO-MCNC: 99 MG/DL (ref 65–117)
SERVICE CMNT-IMP: NORMAL
SERVICE CMNT-IMP: NORMAL

## 2025-03-28 PROCEDURE — 02HV33Z INSERTION OF INFUSION DEVICE INTO SUPERIOR VENA CAVA, PERCUTANEOUS APPROACH: ICD-10-PCS | Performed by: STUDENT IN AN ORGANIZED HEALTH CARE EDUCATION/TRAINING PROGRAM

## 2025-03-28 PROCEDURE — 6360000002 HC RX W HCPCS: Performed by: INTERNAL MEDICINE

## 2025-03-28 PROCEDURE — 6370000000 HC RX 637 (ALT 250 FOR IP): Performed by: INTERNAL MEDICINE

## 2025-03-28 PROCEDURE — 6370000000 HC RX 637 (ALT 250 FOR IP): Performed by: STUDENT IN AN ORGANIZED HEALTH CARE EDUCATION/TRAINING PROGRAM

## 2025-03-28 PROCEDURE — 36415 COLL VENOUS BLD VENIPUNCTURE: CPT

## 2025-03-28 PROCEDURE — 36569 INSJ PICC 5 YR+ W/O IMAGING: CPT

## 2025-03-28 PROCEDURE — 2580000003 HC RX 258: Performed by: INTERNAL MEDICINE

## 2025-03-28 PROCEDURE — 6370000000 HC RX 637 (ALT 250 FOR IP): Performed by: HOSPITALIST

## 2025-03-28 PROCEDURE — 99232 SBSQ HOSP IP/OBS MODERATE 35: CPT | Performed by: INTERNAL MEDICINE

## 2025-03-28 PROCEDURE — 82565 ASSAY OF CREATININE: CPT

## 2025-03-28 PROCEDURE — 2500000003 HC RX 250 WO HCPCS: Performed by: STUDENT IN AN ORGANIZED HEALTH CARE EDUCATION/TRAINING PROGRAM

## 2025-03-28 PROCEDURE — 2500000003 HC RX 250 WO HCPCS: Performed by: HOSPITALIST

## 2025-03-28 PROCEDURE — 82962 GLUCOSE BLOOD TEST: CPT

## 2025-03-28 PROCEDURE — 94761 N-INVAS EAR/PLS OXIMETRY MLT: CPT

## 2025-03-28 RX ORDER — SODIUM CHLORIDE 0.9 % (FLUSH) 0.9 %
5-40 SYRINGE (ML) INJECTION PRN
Status: DISCONTINUED | OUTPATIENT
Start: 2025-03-28 | End: 2025-03-28 | Stop reason: HOSPADM

## 2025-03-28 RX ORDER — LIDOCAINE HYDROCHLORIDE 10 MG/ML
50 INJECTION, SOLUTION EPIDURAL; INFILTRATION; INTRACAUDAL; PERINEURAL ONCE
Status: DISCONTINUED | OUTPATIENT
Start: 2025-03-28 | End: 2025-03-28 | Stop reason: HOSPADM

## 2025-03-28 RX ORDER — SODIUM CHLORIDE 0.9 % (FLUSH) 0.9 %
5-40 SYRINGE (ML) INJECTION EVERY 12 HOURS SCHEDULED
Status: DISCONTINUED | OUTPATIENT
Start: 2025-03-28 | End: 2025-03-28 | Stop reason: HOSPADM

## 2025-03-28 RX ORDER — SODIUM CHLORIDE 9 MG/ML
INJECTION, SOLUTION INTRAVENOUS PRN
Status: DISCONTINUED | OUTPATIENT
Start: 2025-03-28 | End: 2025-03-28 | Stop reason: HOSPADM

## 2025-03-28 RX ORDER — LACTOBACILLUS RHAMNOSUS GG 10B CELL
1 CAPSULE ORAL
Qty: 30 CAPSULE | Refills: 0 | Status: SHIPPED | OUTPATIENT
Start: 2025-03-29

## 2025-03-28 RX ADMIN — Medication 1 CAPSULE: at 09:24

## 2025-03-28 RX ADMIN — MIDODRINE HYDROCHLORIDE 5 MG: 5 TABLET ORAL at 09:24

## 2025-03-28 RX ADMIN — SODIUM CHLORIDE, PRESERVATIVE FREE 10 ML: 5 INJECTION INTRAVENOUS at 10:26

## 2025-03-28 RX ADMIN — ERTAPENEM SODIUM 1000 MG: 1 INJECTION INTRAMUSCULAR; INTRAVENOUS at 10:27

## 2025-03-28 RX ADMIN — METOPROLOL SUCCINATE 50 MG: 50 TABLET, FILM COATED, EXTENDED RELEASE ORAL at 09:24

## 2025-03-28 RX ADMIN — CETIRIZINE HYDROCHLORIDE 5 MG: 10 TABLET, FILM COATED ORAL at 09:24

## 2025-03-28 RX ADMIN — DIPHENHYDRAMINE HYDROCHLORIDE 25 MG: 25 CAPSULE ORAL at 09:24

## 2025-03-28 RX ADMIN — ENOXAPARIN SODIUM 40 MG: 100 INJECTION SUBCUTANEOUS at 09:24

## 2025-03-28 RX ADMIN — MIDODRINE HYDROCHLORIDE 5 MG: 5 TABLET ORAL at 11:49

## 2025-03-28 NOTE — CARE COORDINATION
Updates at 1630:    Bedside teaching has been completed.  Per Deann, [facility] Liaison, patient's spouse and daughter are teachable and had no concerns.      CM called [facility] [phone] and confirmed a SOC date of:  Monday, 3/31.  CM informed Bhavana, patient's daughter of updates.  DC orders and DC Summary uploaded to [employer].           Case Management Discharge Note    Discharge Plan:  Anticipate patient discharging today to home with continued family support & the following services:      CM met with patient, patient's spouse, Chandra, and also called and spoke to patient's daughter, Bhavana [phone], to discuss DCP updates.        (1)  DC on IV abx:  Ertrapenem 1gm IV daily through 4/28/2025.  [facility] (786) --Accepted.  Bedside teaching is scheduled today with patient's spouse and patient's dtr, Bhavana at 1500.  PENDING PICC placement (order is in).        (2)  [facility]/OT [phone]--Accepted.  PICC line and lab order has been uploaded to [facility].  Info on AVS.        (3)  Transportation:  Spouse at bedside is assisting.      Patient's family stated full understanding of the DCP and stated no other needs.  Medical treatment team informed of updates.      ______________________________________  AMMY Rodriguez, RN-[facility]- Care Management  Available via [email or web address]  3/28/2025  11:35 AM

## 2025-03-28 NOTE — PROGRESS NOTES
[facility]                                                                               Hospitalist Progress Note  DEREK MCKOY MD          Date of Service:  3/28/2025  NAME:  Rusty Nevarez  :  1945  MRN:  [id number]    Please note that this dictation was completed with [email or web address], the computer voice recognition software.  Quite often unanticipated grammatical, syntax, homophones, and other interpretive errors are inadvertently transcribed by the computer software.  Please disregard these errors.  Please excuse any errors that have escaped final proofreading.    Admission Summary:   79 year old female w/ HTN, CKD 3b, HLD, Afib noted to have Streptococcus intermedius bacteremia complicated by hepatic abscess.        Interval history / Subjective:   No acute issues overnight      Assessment & Plan:     Anticipated discharge date : 3/28   Anticipated disposition : Home w/ Home infusion   Barriers to discharge : medical stability      Bacteremia due to Streptococcus intermedius / suspected hepatic abscess POA: she denies any pain today other than the rash. CT abdomen as noted above. Blood cultures isolated Streptococcus intermedius. Follow up blood cultures have been neg. Liver lesion biopsy cultures remain sterile and no malignant cells noted on pathology. Failed transition to unasyn and kostas d/t drug rash. Back on IV Cefepime, Metronidazole and IV Vancomycin for now.  - Vanc/flagyl/aztreonam for now per ID. When rash is resolved will trial kostas again (defer this to ID)    - Continue to monitor clinical progress   - Ertapenem at dc   - leuks elevated d/t steroids     Maculopapular rash, generalized: POA. Likely a drug reaction. Noted 3/21. Unclear which of the prior antibiotics she had reacted to but likely Unasyn. Had been improving but noticeably worse this am       PAF (paroxysmal atrial fibrillation) / Hx of subarachnoid hemorrhage: POA. rate

## 2025-03-28 NOTE — DISCHARGE SUMMARY
Discharge Summary   Please note that this dictation was completed with [employer], the computer voice recognition software.  Quite often unanticipated grammatical, syntax, homophones, and other interpretive errors are inadvertently transcribed by the computer software.  Please disregard these errors.  Please excuse any errors that have escaped final proofreading.    PATIENT ID: Rusty Nevarez  MRN: [id number]   YOB: 1945    DATE OF ADMISSION: 3/15/2025  5:38 PM    DATE OF DISCHARGE: 3/28/2025  PRIMARY CARE PROVIDER: José Manuel Coulter DO         ATTENDING PHYSICIAN: DEREK MCKOY MD  DISCHARGING PROVIDER: DEREK MCKOY MD       CONSULTATIONS: IP CONSULT TO INFECTIOUS DISEASES  IP CONSULT TO CASE MANAGEMENT  IP CONSULT TO VASCULAR ACCESS TEAM  IP CONSULT TO PHARMACY  IP CONSULT TO PHARMACY  IP CONSULT TO VASCULAR ACCESS TEAM    PROCEDURES/SURGERIES: * No surgery found *    ADMITTING HPI from excerpted H&P   79 y.o.  female with PMHx atrial fibrillation, not on anticoagulation, subarachnoid hemorrhage due to aneurysm status post clipping 2015, hydrocephalus status post  shunt, prediabetes on metformin, cognitive impairment on donepezil, CKD 3, hyperlipidemia, hypertension brought into [facility] from home by .     History obtained from  at bedside.  Last Wednesday patient had shivering for 30 to 40 minutes, associated with vomiting and diarrhea.  She had the shivering again the following night.  Today she had shivering, diarrhea and coughed up a small amount of liquid that she had drank.  She seemed more fatigued than baseline.  Also some increased urinary frequency.  He denies any change in her mental status.  No recent travel or sick contact.     He reports her PCP had discontinued atenolol and change patient to metoprolol ER for hypertension.  Patient was seen in the emergency room on March 25 for syncope, noted to be in A-fib, creatinine at that time was 1.79.  She was felt to

## 2025-03-28 NOTE — PROGRESS NOTES
[facility] Progress Note  Chandra Apodaca DO  [phone] Office  [phone] Fax    3/28/2025      Assessment & Plan:     Streptococcus intermedius bacteremia complicated by hepatic abscess.  Repeat blood cultures are sterile to date.  TTE negative for vegetation.  This organism is associated with endocarditis however CHANDNI not necessary as patient will receive an extended course of antibiotics for the hepatic abscess.  Linezolid unfortunately not an option, patient on Zyprexa.  Hepatic abscess.  Likely due to above.  Status post aspiration by IR 3/19.  Cultures NGSF. Will need a 6-week course of IV antibiotics  Diffuse maculopapular rash.  Suspect drug reaction, however unclear if it was due to cefepime or Unasyn or meropenem..  Rash resolving on meropenem.  Plan discharge on ertapenem.  IV antibiotic orders below.          Fever and leukocytosis.  Due to above.  WBC slowly trending down.  E. coli and Enterococcus faecium isolated from urine.  UA bland.  Significance unclear.  This will be covered by current antibiotics  CKD 3.  Monitor closely on antibiotics  History of cefazolin and erythromycin allergies.   Hydrocephalus.  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      Post Discharge PICC and Antibiotic Orders    1.  Diagnosis: Hepatic abscess  2.  Antibiotic: Ertapenem 1 g IV daily through 4/28/2025  3.  Routine PICC/ Natalie/ Portacath Care including PRN catheter flow management  4.  Weekly labs:   [x] CBC/diff/platelets   [x] BUN/Creatinine   [] CPK   [x] AST/TotalBilirubin/AlkalinePhosphatase   [x] CRP   []Trough Vancomycin level goal 15-20  5.  Fax lab to [phone]  Call Critical Lab Values to [phone]  6.  May send to IR for line evaluation or replacement [phone]  7.  Home Health to pull PIC line at end of therapy or send to IR for Natalie removal  8.  Allergies:    Allergies   Allergen Reactions    Cefazolin      Patient does  not recall    Erythromycin Other (See Comments)     Does not recall            Chandra Apodaca DO               Subjective:     Up in chair, rash much improved.      Objective:     Vitals: /65   Pulse 72   Temp 98.2 °F (36.8 °C) (Oral)   Resp 16   Ht 1.651 m (5' 5\")   Wt 65.8 kg (145 lb 1 oz)   SpO2 99%   BMI 24.14 kg/m²      Tmax:  Temp (24hrs), Av °F (36.7 °C), Min:97.3 °F (36.3 °C), Max:98.4 °F (36.9 °C)      Exam:   Patient is intubated:  no    Physical Examination:   General:  Alert, cooperative, no distress   Head:  Normocephalic, atraumatic.   Eyes:  Conjunctivae clear   Neck: Supple       Lungs:   No distress.     Chest wall:     Heart:     Abdomen:   non-distended   Extremities: Moves all.    Skin: Rash much improved   Neurologic: Alert, pleasant     Labs:        Invalid input(s): \"ITNL\"   No results for input(s): \"CPK\", \"CKMB\" in the last 72 hours.    Invalid input(s): \"TROIQ\"  Recent Labs     25  0348 25  0612   WBC 17.7* 16.3*   HGB 9.3* 9.5*   HCT 28.6* 29.6*   * 446*     No results for input(s): \"INR\", \"APTT\" in the last 72 hours.    Invalid input(s): \"PTP\"  Needs: urine analysis, urine sodium, protein and creatinine  No results found for: \"KU\"      Cultures:     No results found for: \"SDES\"  No components found for: \"CULT\"    Radiology:     Medications       [unfilled]        Case discussed with: Family at bedside    A total time of 35 minutes was spent on today's encounter.  Greater than 50% of the time was spent on the following:  Preparing for visit and chart review.  Obtaining and/or reviewing separately obtained history  Performing a medically appropriate exam and/or evaluation  Counseling and educating a patient/family/caregiver as noted above  Placing relevant orders  Referring and communicating with other professionals (not separately reported)  Independently interpreting results (not separately reported) and communicating results to the

## 2025-03-28 NOTE — PLAN OF CARE
Problem: Chronic Conditions and Co-morbidities  Goal: Patient's chronic conditions and co-morbidity symptoms are monitored and maintained or improved  Outcome: Progressing     Problem: Safety - Adult  Goal: Free from fall injury  3/28/2025 1012 by Fabiola Olivera RN  Outcome: Progressing     Problem: Respiratory - Adult  Goal: Achieves optimal ventilation and oxygenation  Outcome: Progressing     Problem: Discharge Planning  Goal: Discharge to home or other facility with appropriate resources  3/28/2025 1012 by Fabiola Olivera RN  Outcome: Progressing     Problem: Skin/Tissue Integrity  Goal: Skin integrity remains intact  Description: 1.  Monitor for areas of redness and/or skin breakdown  2.  Assess vascular access sites hourly  3.  Every 4-6 hours minimum:  Change oxygen saturation probe site  4.  Every 4-6 hours:  If on nasal continuous positive airway pressure, respiratory therapy assess nares and determine need for appliance change or resting period  3/28/2025 1012 by Fabiola Olivera RN  Outcome: Progressing

## 2025-03-28 NOTE — PROCEDURES
PICC Line Insertion Procedure Note    Procedure: Insertion of #4 FR/18G PICC    Indications:  Long Term IV therapy    Procedure Details   Informed consent was obtained for the procedure, including sedation.  Risks of lung perforation, hemorrhage, and adverse drug reaction were discussed.     #4 FR/18G PICC inserted to the R Basilic vein per hospital protocol.   Blood return:  yes    Findings:  Catheter inserted to 35 cm, with 1 cm. Exposed. Mid upper arm circumference is 24 cm.   Catheter was flushed with 20 cc NS. Patient did tolerate procedure well.    Recommendations:  Tip in distal SVC. Confirmed with 3CG. Okay to use. Handoff given to [facility]  PICC Brochure given to patient with teaching instruction.PROCEDURE NOTE  Date: 3/28/2025   Name: Rusty Nevarez  YOB: 1945    Procedures

## 2025-04-02 ENCOUNTER — TELEPHONE (OUTPATIENT)
Facility: CLINIC | Age: 80
End: 2025-04-02

## 2025-04-02 NOTE — TELEPHONE ENCOUNTER
Dr. José Manuel Coulter (Pt's PCP) requesting call back from Dr. Apodaca if possible after 4 pm today regarding possible reaction pt is having to Ertapenem stating pt is not at baseline, is having increased diarrhea and may also need testing for C-Diff.    Please advise at [phone]. Ldm

## 2025-04-16 ENCOUNTER — TELEPHONE (OUTPATIENT)
Facility: CLINIC | Age: 80
End: 2025-04-16

## 2025-04-16 NOTE — TELEPHONE ENCOUNTER
Chandra Apodaca, Saundra Jacinto D1 hour ago (12:52 PM)       Please let her know that it is fine to take Eliquis and Invanz together.  There are no drug-drug interactions

## 2025-04-16 NOTE — TELEPHONE ENCOUNTER
Pt's daughter, Bhavana Nevarez called to advise pt's cardiologist (Dr. Chandra Moseley) prescribed Eliquis for pt and is requesting call back to advise if safe to take or if contraindicated while on Ertapenem.    Please advise at [phone]. Ldm

## 2025-04-16 NOTE — TELEPHONE ENCOUNTER
Called pt, and left a voice message, asking that she call office back when able in regards to her medication.

## 2025-04-17 PROBLEM — N39.0 URINARY TRACT INFECTION: Status: RESOLVED | Noted: 2025-03-18 | Resolved: 2025-04-17

## 2025-06-23 ENCOUNTER — OFFICE VISIT (OUTPATIENT)
Age: 80
End: 2025-06-23
Payer: MEDICARE

## 2025-06-23 VITALS
SYSTOLIC BLOOD PRESSURE: 130 MMHG | DIASTOLIC BLOOD PRESSURE: 80 MMHG | HEIGHT: 65 IN | HEART RATE: 81 BPM | BODY MASS INDEX: 24.14 KG/M2 | OXYGEN SATURATION: 96 % | TEMPERATURE: 97 F

## 2025-06-23 DIAGNOSIS — G91.9 HYDROCEPHALUS MANAGED WITH VP SHUNT (HCC): ICD-10-CM

## 2025-06-23 DIAGNOSIS — Z86.79 HISTORY OF SUBARACHNOID HEMORRHAGE: ICD-10-CM

## 2025-06-23 DIAGNOSIS — Z98.2 HYDROCEPHALUS MANAGED WITH VP SHUNT (HCC): ICD-10-CM

## 2025-06-23 DIAGNOSIS — F01.B0 MODERATE VASCULAR DEMENTIA WITHOUT BEHAVIORAL DISTURBANCE, PSYCHOTIC DISTURBANCE, MOOD DISTURBANCE, OR ANXIETY (HCC): Primary | ICD-10-CM

## 2025-06-23 DIAGNOSIS — I67.1 CEREBRAL ANEURYSM: ICD-10-CM

## 2025-06-23 PROCEDURE — 3075F SYST BP GE 130 - 139MM HG: CPT | Performed by: PSYCHIATRY & NEUROLOGY

## 2025-06-23 PROCEDURE — G8428 CUR MEDS NOT DOCUMENT: HCPCS | Performed by: PSYCHIATRY & NEUROLOGY

## 2025-06-23 PROCEDURE — 1123F ACP DISCUSS/DSCN MKR DOCD: CPT | Performed by: PSYCHIATRY & NEUROLOGY

## 2025-06-23 PROCEDURE — 99214 OFFICE O/P EST MOD 30 MIN: CPT | Performed by: PSYCHIATRY & NEUROLOGY

## 2025-06-23 PROCEDURE — G8400 PT W/DXA NO RESULTS DOC: HCPCS | Performed by: PSYCHIATRY & NEUROLOGY

## 2025-06-23 PROCEDURE — 1090F PRES/ABSN URINE INCON ASSESS: CPT | Performed by: PSYCHIATRY & NEUROLOGY

## 2025-06-23 PROCEDURE — G8420 CALC BMI NORM PARAMETERS: HCPCS | Performed by: PSYCHIATRY & NEUROLOGY

## 2025-06-23 PROCEDURE — 3079F DIAST BP 80-89 MM HG: CPT | Performed by: PSYCHIATRY & NEUROLOGY

## 2025-06-23 PROCEDURE — 1036F TOBACCO NON-USER: CPT | Performed by: PSYCHIATRY & NEUROLOGY

## 2025-06-23 RX ORDER — CANDESARTAN 8 MG/1
8 TABLET ORAL DAILY
COMMUNITY
Start: 2025-05-29

## 2025-06-23 RX ORDER — MEMANTINE HYDROCHLORIDE 5 MG/1
TABLET ORAL
Qty: 60 TABLET | Refills: 5 | Status: SHIPPED | OUTPATIENT
Start: 2025-06-23

## 2025-06-23 NOTE — PATIENT INSTRUCTIONS
Patient Education        Helping A Person With Dementia: Care Instructions  How can you care for someone who has dementia?    Dementia is a loss of mental skills that affects daily life. It is different from mild memory loss that occurs with aging. Dementia can cause problems with memory, thinking clearly, and planning. It is different for everyone. But it usually gets worse slowly. Some people who have dementia can function well for a long time. But at some point it may become hard for the person to care for themself.  It can be upsetting to learn that someone you care about has this condition. You may be afraid and worried about what will happen. You may wonder how you will care for the person. There is no cure for dementia. But medicine may be able to slow memory loss and improve thinking for a while. Other medicines may help with sleep, depression, and behavior changes.  Dementia is different for everyone. In some cases, people can function well for a long time. You can help this person by making their home life easier and safer. You also need to take care of yourself. Caregiving can be stressful. But support is available to help you and give you a break when you need it.  The Alzheimer's Association offers good information and support. If you are caring for someone with dementia, you can help make life safer and more comfortable. You can also help this person make decisions about future care. You may also want to bring up legal and financial issues. These are hard but important conversations to have.  Follow-up care is a key part of your loved one's treatment and safety. Be sure to make and go to all appointments, and call your doctor if your loved one is having problems. It's also a good idea to know your loved one's test results and keep a list of the medicines he or she takes.  Taking care of the person  If the person takes medicine for dementia, help them take it exactly as prescribed. Call the doctor if

## 2025-06-23 NOTE — PROGRESS NOTES
NEUROLOGY CLINIC NOTE    Patient ID:  Rusty Nevarez  [id number]  80 y.o.  1945    Date of Visit:  June 23, 2025    Reason for Visit:  dementia    Chief Complaint   Patient presents with    Follow-up    Dementia       History of Present Illness:     Past Medical History:   Diagnosis Date    Atrial fibrillation (HCC)     CKD (chronic kidney disease)     Hydrocephalus (HCC)     Hyperlipidemia     Hypertension     Liver abscess 03/2025    with Streptococcus intermedius bacteremia    Mild dementia (HCC)     Prediabetes     Subarachnoid bleed (HCC)     due to aneurysm    UTI (urinary tract infection) 03/2025    E. coli and Enterococcus faecium      Past Surgical History:   Procedure Laterality Date    BRAIN ANEURYSM SURGERY      CT NEEDLE BIOPSY LIVER PERCUTANEOUS  3/19/2025    CT NEEDLE BIOPSY LIVER PERCUTANEOUS 3/19/2025 [facility] RAD CT    VENTRICULOPERITONEAL SHUNT          Current Outpatient Medications on File Prior to Visit   Medication Sig Dispense Refill    lactobacillus (CULTURELLE) capsule Take 1 capsule by mouth daily (with breakfast) 30 capsule 0    metoprolol succinate (TOPROL XL) 50 MG extended release tablet Take 1 tablet by mouth daily      alendronate (FOSAMAX) 70 MG tablet Take 1 tablet by mouth every 7 days Last dose 3/10      donepezil (ARICEPT) 10 MG tablet TAKE 1 TABLET (10 MG) BY ORAL ROUTE ONCE DAILY IN THE EVENING      metFORMIN (GLUCOPHAGE-XR) 500 MG extended release tablet Take 1 tablet by mouth 2 times daily (with meals)      rosuvastatin (CRESTOR) 10 MG tablet Take 1 tablet by mouth daily      cyanocobalamin 100 MCG tablet Take 1 tablet by mouth daily       No current facility-administered medications on file prior to visit.       Allergies   Allergen Reactions    Unasyn [Ampicillin-Sulbactam Sodium] Rash     4/2025; tolerated cefepime      Cefazolin      Patient does not recall    Erythromycin Other (See Comments)     Does not recall       Social History     Tobacco Use    Smoking status: